# Patient Record
Sex: MALE | Race: OTHER | NOT HISPANIC OR LATINO | URBAN - METROPOLITAN AREA
[De-identification: names, ages, dates, MRNs, and addresses within clinical notes are randomized per-mention and may not be internally consistent; named-entity substitution may affect disease eponyms.]

---

## 2017-04-06 ENCOUNTER — OUTPATIENT (OUTPATIENT)
Dept: OUTPATIENT SERVICES | Facility: HOSPITAL | Age: 37
LOS: 1 days | End: 2017-04-06

## 2017-04-06 VITALS
TEMPERATURE: 98 F | WEIGHT: 304.9 LBS | HEART RATE: 84 BPM | HEIGHT: 73.5 IN | DIASTOLIC BLOOD PRESSURE: 86 MMHG | SYSTOLIC BLOOD PRESSURE: 132 MMHG | RESPIRATION RATE: 16 BRPM

## 2017-04-06 DIAGNOSIS — G47.33 OBSTRUCTIVE SLEEP APNEA (ADULT) (PEDIATRIC): ICD-10-CM

## 2017-04-06 DIAGNOSIS — L72.9 FOLLICULAR CYST OF THE SKIN AND SUBCUTANEOUS TISSUE, UNSPECIFIED: Chronic | ICD-10-CM

## 2017-04-06 DIAGNOSIS — S86.012A STRAIN OF LEFT ACHILLES TENDON, INITIAL ENCOUNTER: Chronic | ICD-10-CM

## 2017-04-06 DIAGNOSIS — D16.4 BENIGN NEOPLASM OF BONES OF SKULL AND FACE: ICD-10-CM

## 2017-04-06 LAB
BLD GP AB SCN SERPL QL: NEGATIVE — SIGNIFICANT CHANGE UP
BUN SERPL-MCNC: 17 MG/DL — SIGNIFICANT CHANGE UP (ref 7–23)
CALCIUM SERPL-MCNC: 9.6 MG/DL — SIGNIFICANT CHANGE UP (ref 8.4–10.5)
CHLORIDE SERPL-SCNC: 104 MMOL/L — SIGNIFICANT CHANGE UP (ref 98–107)
CO2 SERPL-SCNC: 23 MMOL/L — SIGNIFICANT CHANGE UP (ref 22–31)
CREAT SERPL-MCNC: 1.4 MG/DL — HIGH (ref 0.5–1.3)
GLUCOSE SERPL-MCNC: 93 MG/DL — SIGNIFICANT CHANGE UP (ref 70–99)
HCT VFR BLD CALC: 43.9 % — SIGNIFICANT CHANGE UP (ref 39–50)
HGB BLD-MCNC: 15 G/DL — SIGNIFICANT CHANGE UP (ref 13–17)
MCHC RBC-ENTMCNC: 30.5 PG — SIGNIFICANT CHANGE UP (ref 27–34)
MCHC RBC-ENTMCNC: 34.2 % — SIGNIFICANT CHANGE UP (ref 32–36)
MCV RBC AUTO: 89.2 FL — SIGNIFICANT CHANGE UP (ref 80–100)
PLATELET # BLD AUTO: 215 K/UL — SIGNIFICANT CHANGE UP (ref 150–400)
PMV BLD: 10.6 FL — SIGNIFICANT CHANGE UP (ref 7–13)
POTASSIUM SERPL-MCNC: 3.9 MMOL/L — SIGNIFICANT CHANGE UP (ref 3.5–5.3)
POTASSIUM SERPL-SCNC: 3.9 MMOL/L — SIGNIFICANT CHANGE UP (ref 3.5–5.3)
RBC # BLD: 4.92 M/UL — SIGNIFICANT CHANGE UP (ref 4.2–5.8)
RBC # FLD: 14.1 % — SIGNIFICANT CHANGE UP (ref 10.3–14.5)
RH IG SCN BLD-IMP: POSITIVE — SIGNIFICANT CHANGE UP
SODIUM SERPL-SCNC: 144 MMOL/L — SIGNIFICANT CHANGE UP (ref 135–145)
WBC # BLD: 5.2 K/UL — SIGNIFICANT CHANGE UP (ref 3.8–10.5)
WBC # FLD AUTO: 5.2 K/UL — SIGNIFICANT CHANGE UP (ref 3.8–10.5)

## 2017-04-06 RX ORDER — SODIUM CHLORIDE 9 MG/ML
1000 INJECTION, SOLUTION INTRAVENOUS
Qty: 0 | Refills: 0 | Status: DISCONTINUED | OUTPATIENT
Start: 2017-04-18 | End: 2017-05-03

## 2017-04-06 NOTE — H&P PST ADULT - MUSCULOSKELETAL
details… detailed exam no calf tenderness/no joint swelling/ROM intact/no joint erythema/no joint warmth

## 2017-04-06 NOTE — H&P PST ADULT - FAMILY HISTORY
Father  Still living? Yes, Estimated age: Age Unknown  Family history of prostate cancer, Age at diagnosis: Age Unknown

## 2017-04-06 NOTE — H&P PST ADULT - NEGATIVE MUSCULOSKELETAL SYMPTOMS
no muscle weakness/no muscle cramps/no joint swelling/no stiffness/no myalgia/no arthritis/no neck pain

## 2017-04-06 NOTE — H&P PST ADULT - NEGATIVE ALLERGY TYPES
no reactions to medicines/no outdoor environmental allergies/no indoor environmental allergies/no reactions to animals/no reactions to food

## 2017-04-06 NOTE — H&P PST ADULT - PROBLEM SELECTOR PLAN 1
Scheduled for enucleation cyst left maxilla buccal fat flap, possible removal teeth # 12, 13 on 4/18/2017. labs done and results pending. Preop instruction given and explained. Famotidine provided with instruction. Verbalized understanding.

## 2017-04-06 NOTE — H&P PST ADULT - RS GEN PE MLT RESP DETAILS PC
breath sounds equal/airway patent/no rhonchi/no wheezes/good air movement/clear to auscultation bilaterally/no rales/respirations non-labored

## 2017-04-06 NOTE — H&P PST ADULT - NEGATIVE ENMT SYMPTOMS
no nasal discharge/no ear pain/no tinnitus/no hearing difficulty/no nasal obstruction/no vertigo no ear pain/no post-nasal discharge/no nose bleeds/no nasal discharge/no nasal obstruction/no dysphagia/no hearing difficulty/no gum bleeding/no vertigo/no tinnitus

## 2017-04-06 NOTE — H&P PST ADULT - HISTORY OF PRESENT ILLNESS
37 yo male 37 yo male with no significant pmhx present to have PST evaluation for enucleation cyst left maxilla buccal fat flap, possible removal teeth # 12,13 on 4/18/2017. Patient c/o headache, left jaw pain, swelling of gum, went to dentist for an evaluation, was sent to Dr. Garcia (oral surgeon), had CT scan done which revealed  "cyst on jaw",  cyst was drained & biopsy was done. Patient was sent for further evaluation, seen by Dr. Uriarte, had CT scan, surgical intervention was recommended.

## 2017-04-06 NOTE — H&P PST ADULT - NSANTHOSAYNRD_GEN_A_CORE
No. INGE screening performed.  STOP BANG Legend: 0-2 = LOW Risk; 3-4 = INTERMEDIATE Risk; 5-8 = HIGH Risk

## 2017-04-06 NOTE — H&P PST ADULT - NEGATIVE NEUROLOGICAL SYMPTOMS
no weakness/no transient paralysis/no tremors/no syncope/no focal seizures/no paresthesias/no headache/no vertigo

## 2017-04-06 NOTE — H&P PST ADULT - NEGATIVE GENERAL GENITOURINARY SYMPTOMS
no hematuria/no renal colic/no flank pain L/normal urinary frequency/no incontinence no renal colic/no hematuria/no incontinence/no flank pain L/normal urinary frequency/no bladder infections

## 2017-04-06 NOTE — H&P PST ADULT - ENMT COMMENTS
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

## 2017-04-17 ENCOUNTER — RESULT REVIEW (OUTPATIENT)
Age: 37
End: 2017-04-17

## 2017-04-18 ENCOUNTER — OUTPATIENT (OUTPATIENT)
Dept: OUTPATIENT SERVICES | Facility: HOSPITAL | Age: 37
LOS: 1 days | Discharge: HOME | End: 2017-04-18
Payer: COMMERCIAL

## 2017-04-18 VITALS
RESPIRATION RATE: 14 BRPM | TEMPERATURE: 98 F | DIASTOLIC BLOOD PRESSURE: 78 MMHG | OXYGEN SATURATION: 100 % | HEART RATE: 72 BPM | SYSTOLIC BLOOD PRESSURE: 124 MMHG

## 2017-04-18 VITALS
OXYGEN SATURATION: 94 % | HEIGHT: 73.5 IN | TEMPERATURE: 98 F | SYSTOLIC BLOOD PRESSURE: 145 MMHG | RESPIRATION RATE: 18 BRPM | WEIGHT: 304.9 LBS | HEART RATE: 71 BPM | DIASTOLIC BLOOD PRESSURE: 85 MMHG

## 2017-04-18 DIAGNOSIS — D16.4 BENIGN NEOPLASM OF BONES OF SKULL AND FACE: ICD-10-CM

## 2017-04-18 DIAGNOSIS — S86.012A STRAIN OF LEFT ACHILLES TENDON, INITIAL ENCOUNTER: Chronic | ICD-10-CM

## 2017-04-18 DIAGNOSIS — L72.9 FOLLICULAR CYST OF THE SKIN AND SUBCUTANEOUS TISSUE, UNSPECIFIED: Chronic | ICD-10-CM

## 2017-04-18 PROCEDURE — 88305 TISSUE EXAM BY PATHOLOGIST: CPT | Mod: 26

## 2017-04-18 RX ORDER — ONDANSETRON 8 MG/1
4 TABLET, FILM COATED ORAL ONCE
Qty: 0 | Refills: 0 | Status: COMPLETED | OUTPATIENT
Start: 2017-04-18 | End: 2017-04-18

## 2017-04-18 RX ORDER — FENTANYL CITRATE 50 UG/ML
50 INJECTION INTRAVENOUS
Qty: 0 | Refills: 0 | Status: DISCONTINUED | OUTPATIENT
Start: 2017-04-18 | End: 2017-04-18

## 2017-04-18 RX ORDER — CHLORHEXIDINE GLUCONATE 213 G/1000ML
15 SOLUTION TOPICAL
Qty: 1 | Refills: 0 | OUTPATIENT
Start: 2017-04-18 | End: 2017-05-02

## 2017-04-18 RX ORDER — KETOROLAC TROMETHAMINE 30 MG/ML
1 SYRINGE (ML) INJECTION
Qty: 20 | Refills: 0 | OUTPATIENT
Start: 2017-04-18 | End: 2017-04-23

## 2017-04-18 RX ADMIN — ONDANSETRON 4 MILLIGRAM(S): 8 TABLET, FILM COATED ORAL at 13:30

## 2017-04-18 NOTE — H&P ADULT. - NEGATIVE ENMT SYMPTOMS
no ear pain/no nasal discharge/no post-nasal discharge/no gum bleeding/no nose bleeds/no hearing difficulty/no nasal obstruction/no dysphagia

## 2017-04-18 NOTE — H&P ADULT. - HISTORY OF PRESENT ILLNESS
37 yo male, with no significant PMH s/p left sinus biopsy, with inflammatory cyst of left maxillary sinus.  Presents for enucleation cyst left maxilla buccal fat flap, possible removal teeth # 12,13.

## 2017-04-18 NOTE — ASU DISCHARGE PLAN (ADULT/PEDIATRIC). - SPECIAL INSTRUCTIONS
Follow up with Dr. Uriarte 4/25/17 at 1145 am. Rx sent to pharmacy by MD Follow up with Dr. Uriarte 4/25/17 at 1145 am. Rx sent to pharmacy by MD. Do not drink through a straw.

## 2017-04-18 NOTE — H&P ADULT. - RS GEN PE MLT RESP DETAILS PC
good air movement/airway patent/breath sounds equal/clear to auscultation bilaterally/no wheezes/no rales/no rhonchi/respirations non-labored

## 2017-04-18 NOTE — H&P ADULT. - ENMT COMMENTS
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

## 2017-04-18 NOTE — ASU DISCHARGE PLAN (ADULT/PEDIATRIC). - NOTIFY
Fever greater than 101/Bleeding that does not stop Pain not relieved by Medications/Unable to Urinate/Inability to Tolerate Liquids or Foods/Bleeding that does not stop/Persistent Nausea and Vomiting/Fever greater than 101/Swelling that continues

## 2017-04-18 NOTE — ASU DISCHARGE PLAN (ADULT/PEDIATRIC). - MEDICATION SUMMARY - MEDICATIONS TO TAKE
I will START or STAY ON the medications listed below when I get home from the hospital:    ketorolac 10 mg oral tablet  -- 1 tab(s) by mouth every 6 hours  -- It is very important that you take or use this exactly as directed.  Do not skip doses or discontinue unless directed by your doctor.  May cause drowsiness or dizziness.  Obtain medical advice before taking any non-prescription drugs as some may affect the action of this medication.  Take with food or milk.    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  May cause drowsiness.  Alcohol may intensify this effect.  Use care when operating dangerous machinery.  This product contains acetaminophen.  Do not use  with any other product containing acetaminophen to prevent possible liver damage.  Using more of this medication than prescribed may cause serious breathing problems.    -- Indication: For Benign neoplasm of bones of skull and face    ibuprofen 800 mg oral tablet  -- 1 tab(s) by mouth once a day, As Needed. Last dose 4/10/17  -- Indication: For Benign neoplasm of bones of skull and face    Peridex 0.12% mucous membrane liquid  -- 15 milliliter(s) mucous membrane 2 times a day  -- Indication: For Benign neoplasm of bones of skull and face    amoxicillin-clavulanate 875 mg-125 mg oral tablet  -- 1 milligram(s) by mouth every 12 hours  -- Finish all this medication unless otherwise directed by prescriber.  Take with food or milk.    -- Indication: For Benign neoplasm of bones of skull and face

## 2017-04-18 NOTE — H&P ADULT. - PROBLEM SELECTOR PLAN 1
enucleation cyst left maxilla, oac closure with buccal fat pad advancement, possible removal teeth # 12,13.

## 2017-04-19 ENCOUNTER — TRANSCRIPTION ENCOUNTER (OUTPATIENT)
Age: 37
End: 2017-04-19

## 2017-09-26 ENCOUNTER — APPOINTMENT (OUTPATIENT)
Dept: OTOLARYNGOLOGY | Facility: CLINIC | Age: 37
End: 2017-09-26
Payer: COMMERCIAL

## 2017-09-26 VITALS
DIASTOLIC BLOOD PRESSURE: 92 MMHG | WEIGHT: 290 LBS | HEART RATE: 62 BPM | BODY MASS INDEX: 37.22 KG/M2 | SYSTOLIC BLOOD PRESSURE: 138 MMHG | HEIGHT: 74 IN

## 2017-09-26 DIAGNOSIS — Z78.9 OTHER SPECIFIED HEALTH STATUS: ICD-10-CM

## 2017-09-26 DIAGNOSIS — F17.290 NICOTINE DEPENDENCE, OTHER TOBACCO PRODUCT, UNCOMPLICATED: ICD-10-CM

## 2017-09-26 PROCEDURE — 31231 NASAL ENDOSCOPY DX: CPT

## 2017-09-26 PROCEDURE — 99205 OFFICE O/P NEW HI 60 MIN: CPT | Mod: 25

## 2017-10-03 ENCOUNTER — OUTPATIENT (OUTPATIENT)
Dept: OUTPATIENT SERVICES | Facility: HOSPITAL | Age: 37
LOS: 1 days | End: 2017-10-03

## 2017-10-03 VITALS
HEART RATE: 72 BPM | WEIGHT: 276.02 LBS | TEMPERATURE: 98 F | DIASTOLIC BLOOD PRESSURE: 70 MMHG | HEIGHT: 73.25 IN | SYSTOLIC BLOOD PRESSURE: 124 MMHG | RESPIRATION RATE: 16 BRPM

## 2017-10-03 DIAGNOSIS — S86.012A STRAIN OF LEFT ACHILLES TENDON, INITIAL ENCOUNTER: Chronic | ICD-10-CM

## 2017-10-03 DIAGNOSIS — L72.9 FOLLICULAR CYST OF THE SKIN AND SUBCUTANEOUS TISSUE, UNSPECIFIED: Chronic | ICD-10-CM

## 2017-10-03 DIAGNOSIS — M27.40 UNSPECIFIED CYST OF JAW: Chronic | ICD-10-CM

## 2017-10-03 DIAGNOSIS — D16.5 BENIGN NEOPLASM OF LOWER JAW BONE: ICD-10-CM

## 2017-10-03 DIAGNOSIS — G47.33 OBSTRUCTIVE SLEEP APNEA (ADULT) (PEDIATRIC): ICD-10-CM

## 2017-10-03 LAB
BLD GP AB SCN SERPL QL: NEGATIVE — SIGNIFICANT CHANGE UP
HCT VFR BLD CALC: 47.2 % — SIGNIFICANT CHANGE UP (ref 39–50)
HGB BLD-MCNC: 15.7 G/DL — SIGNIFICANT CHANGE UP (ref 13–17)
MCHC RBC-ENTMCNC: 29.4 PG — SIGNIFICANT CHANGE UP (ref 27–34)
MCHC RBC-ENTMCNC: 33.3 % — SIGNIFICANT CHANGE UP (ref 32–36)
MCV RBC AUTO: 88.4 FL — SIGNIFICANT CHANGE UP (ref 80–100)
NRBC # FLD: 0 — SIGNIFICANT CHANGE UP
PLATELET # BLD AUTO: 223 K/UL — SIGNIFICANT CHANGE UP (ref 150–400)
PMV BLD: 10.4 FL — SIGNIFICANT CHANGE UP (ref 7–13)
RBC # BLD: 5.34 M/UL — SIGNIFICANT CHANGE UP (ref 4.2–5.8)
RBC # FLD: 13.7 % — SIGNIFICANT CHANGE UP (ref 10.3–14.5)
RH IG SCN BLD-IMP: POSITIVE — SIGNIFICANT CHANGE UP
WBC # BLD: 5.47 K/UL — SIGNIFICANT CHANGE UP (ref 3.8–10.5)
WBC # FLD AUTO: 5.47 K/UL — SIGNIFICANT CHANGE UP (ref 3.8–10.5)

## 2017-10-03 RX ORDER — IBUPROFEN 200 MG
1 TABLET ORAL
Qty: 0 | Refills: 0 | COMMUNITY

## 2017-10-03 NOTE — H&P PST ADULT - NEGATIVE ENMT SYMPTOMS
no vertigo/no dysphagia/no nose bleeds/no ear pain/no nasal discharge/no post-nasal discharge/no tinnitus/no sinus symptoms/no gum bleeding/no nasal congestion/no nasal obstruction/no hearing difficulty

## 2017-10-03 NOTE — H&P PST ADULT - HISTORY OF PRESENT ILLNESS
37 yo male present to have PST evaluation for left maxillectomy, left infrastructure maxillary & placement, obturator with wires/screws on 10/18/2017. 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 Patient c/o soreness on left side of face. 37 yo male present to have PST evaluation for left maxillectomy, left infrastructure maxillary & placement, obturator with wires/screws on 10/18/2017. Patient report, hx of "cyst on jaw" s/p enucleation cyst left maxilla buccal fat flap on 4/18/2017. Patient states, f/u CT scan was done in 8/2017, which revealed, "recurrent tumor in jaw", surgical intervention was recommended. 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 Patient report, hx of "cyst on jaw", s/p enucleation cyst left maxilla buccal fat flap on 4/18/2017. Patient states, f/u CT scan was done in 8/2017, which revealed, "recurrent tumor in jaw", surgical intervention was recommended.

## 2017-10-03 NOTE — H&P PST ADULT - CONSTITUTIONAL DETAILS
well-groomed/well-developed/no distress/well-nourished obese/no distress/well-developed/well-nourished/well-groomed

## 2017-10-03 NOTE — H&P PST ADULT - NEGATIVE ALLERGY TYPES
no reactions to medicines/no reactions to food/no outdoor environmental allergies/no indoor environmental allergies/no reactions to animals

## 2017-10-03 NOTE — H&P PST ADULT - ENMT COMMENTS
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

## 2017-10-03 NOTE — H&P PST ADULT - PROBLEM SELECTOR PLAN 1
Scheduled for left maxillectomy, left infrastructure maxillary & placement, obturator with wires/screws on 10/18/2017. labs done and results pending.  Preop instruction given and explained. Famotidine provided with instruction. Verbalized understanding.

## 2017-10-03 NOTE — H&P PST ADULT - NEGATIVE NEUROLOGICAL SYMPTOMS
no paresthesias/no transient paralysis/no headache/no weakness/no focal seizures/no syncope/no vertigo/no tremors

## 2017-10-03 NOTE — H&P PST ADULT - PSH
Achilles rupture, left  2010 repair  Cyst of skin  back 4/5/2017  Jaw cyst  s/p enucleation cyst left maxilla buccal fat flap 4/2017

## 2017-10-03 NOTE — H&P PST ADULT - NEGATIVE MUSCULOSKELETAL SYMPTOMS
no neck pain/no muscle weakness/no arthritis/no joint swelling/no muscle cramps/no stiffness/no myalgia

## 2017-10-03 NOTE — H&P PST ADULT - ASSESSMENT
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

## 2017-10-03 NOTE — H&P PST ADULT - MUSCULOSKELETAL
details… detailed exam no joint warmth/no joint erythema/ROM intact/no joint swelling/no calf tenderness

## 2017-10-03 NOTE — H&P PST ADULT - NEGATIVE GENERAL GENITOURINARY SYMPTOMS
no urinary hesitancy/no flank pain L/no incontinence/no dysuria/no bladder infections/no flank pain R/no hematuria/normal urinary frequency

## 2017-10-03 NOTE — H&P PST ADULT - RS GEN PE MLT RESP DETAILS PC
no wheezes/good air movement/clear to auscultation bilaterally/no rhonchi/airway patent/respirations non-labored/breath sounds equal/no rales

## 2017-10-18 ENCOUNTER — INPATIENT (INPATIENT)
Facility: HOSPITAL | Age: 37
LOS: 0 days | Discharge: HOME | End: 2017-10-19
Attending: OTOLARYNGOLOGY | Admitting: OTOLARYNGOLOGY
Payer: COMMERCIAL

## 2017-10-18 ENCOUNTER — TRANSCRIPTION ENCOUNTER (OUTPATIENT)
Age: 37
End: 2017-10-18

## 2017-10-18 ENCOUNTER — RESULT REVIEW (OUTPATIENT)
Age: 37
End: 2017-10-18

## 2017-10-18 ENCOUNTER — APPOINTMENT (OUTPATIENT)
Dept: OTOLARYNGOLOGY | Facility: HOSPITAL | Age: 37
End: 2017-10-18

## 2017-10-18 VITALS
OXYGEN SATURATION: 96 % | SYSTOLIC BLOOD PRESSURE: 141 MMHG | TEMPERATURE: 98 F | HEIGHT: 73.25 IN | DIASTOLIC BLOOD PRESSURE: 87 MMHG | HEART RATE: 81 BPM | WEIGHT: 276.02 LBS | RESPIRATION RATE: 14 BRPM

## 2017-10-18 DIAGNOSIS — D16.4 BENIGN NEOPLASM OF BONES OF SKULL AND FACE: ICD-10-CM

## 2017-10-18 DIAGNOSIS — L72.9 FOLLICULAR CYST OF THE SKIN AND SUBCUTANEOUS TISSUE, UNSPECIFIED: Chronic | ICD-10-CM

## 2017-10-18 DIAGNOSIS — M27.40 UNSPECIFIED CYST OF JAW: Chronic | ICD-10-CM

## 2017-10-18 DIAGNOSIS — S86.012A STRAIN OF LEFT ACHILLES TENDON, INITIAL ENCOUNTER: Chronic | ICD-10-CM

## 2017-10-18 PROCEDURE — 88305 TISSUE EXAM BY PATHOLOGIST: CPT | Mod: 26

## 2017-10-18 PROCEDURE — 88311 DECALCIFY TISSUE: CPT | Mod: 26

## 2017-10-18 PROCEDURE — 88307 TISSUE EXAM BY PATHOLOGIST: CPT | Mod: 26

## 2017-10-18 PROCEDURE — 88300 SURGICAL PATH GROSS: CPT | Mod: 26,59

## 2017-10-18 RX ORDER — HEPARIN SODIUM 5000 [USP'U]/ML
5000 INJECTION INTRAVENOUS; SUBCUTANEOUS EVERY 12 HOURS
Qty: 0 | Refills: 0 | Status: DISCONTINUED | OUTPATIENT
Start: 2017-10-18 | End: 2017-10-19

## 2017-10-18 RX ORDER — INFLUENZA VIRUS VACCINE 15; 15; 15; 15 UG/.5ML; UG/.5ML; UG/.5ML; UG/.5ML
0.5 SUSPENSION INTRAMUSCULAR ONCE
Qty: 0 | Refills: 0 | Status: COMPLETED | OUTPATIENT
Start: 2017-10-18 | End: 2017-10-19

## 2017-10-18 RX ORDER — AMPICILLIN SODIUM AND SULBACTAM SODIUM 250; 125 MG/ML; MG/ML
INJECTION, POWDER, FOR SUSPENSION INTRAMUSCULAR; INTRAVENOUS
Qty: 0 | Refills: 0 | Status: DISCONTINUED | OUTPATIENT
Start: 2017-10-18 | End: 2017-10-19

## 2017-10-18 RX ORDER — CHLORHEXIDINE GLUCONATE 213 G/1000ML
15 SOLUTION TOPICAL
Qty: 0 | Refills: 0 | Status: DISCONTINUED | OUTPATIENT
Start: 2017-10-18 | End: 2017-10-19

## 2017-10-18 RX ORDER — ONDANSETRON 8 MG/1
4 TABLET, FILM COATED ORAL ONCE
Qty: 0 | Refills: 0 | Status: DISCONTINUED | OUTPATIENT
Start: 2017-10-18 | End: 2017-10-18

## 2017-10-18 RX ORDER — SODIUM CHLORIDE 9 MG/ML
1000 INJECTION, SOLUTION INTRAVENOUS
Qty: 0 | Refills: 0 | Status: DISCONTINUED | OUTPATIENT
Start: 2017-10-18 | End: 2017-10-18

## 2017-10-18 RX ORDER — HYDROMORPHONE HYDROCHLORIDE 2 MG/ML
1 INJECTION INTRAMUSCULAR; INTRAVENOUS; SUBCUTANEOUS ONCE
Qty: 0 | Refills: 0 | Status: DISCONTINUED | OUTPATIENT
Start: 2017-10-18 | End: 2017-10-18

## 2017-10-18 RX ORDER — AMPICILLIN SODIUM AND SULBACTAM SODIUM 250; 125 MG/ML; MG/ML
1.5 INJECTION, POWDER, FOR SUSPENSION INTRAMUSCULAR; INTRAVENOUS EVERY 6 HOURS
Qty: 0 | Refills: 0 | Status: DISCONTINUED | OUTPATIENT
Start: 2017-10-18 | End: 2017-10-19

## 2017-10-18 RX ORDER — SODIUM CHLORIDE 9 MG/ML
1000 INJECTION, SOLUTION INTRAVENOUS
Qty: 0 | Refills: 0 | Status: DISCONTINUED | OUTPATIENT
Start: 2017-10-18 | End: 2017-10-19

## 2017-10-18 RX ORDER — MORPHINE SULFATE 50 MG/1
4 CAPSULE, EXTENDED RELEASE ORAL
Qty: 0 | Refills: 0 | Status: DISCONTINUED | OUTPATIENT
Start: 2017-10-18 | End: 2017-10-19

## 2017-10-18 RX ORDER — OXYCODONE AND ACETAMINOPHEN 5; 325 MG/1; MG/1
1 TABLET ORAL EVERY 6 HOURS
Qty: 0 | Refills: 0 | Status: DISCONTINUED | OUTPATIENT
Start: 2017-10-18 | End: 2017-10-19

## 2017-10-18 RX ORDER — HYDROMORPHONE HYDROCHLORIDE 2 MG/ML
0.5 INJECTION INTRAMUSCULAR; INTRAVENOUS; SUBCUTANEOUS
Qty: 0 | Refills: 0 | Status: DISCONTINUED | OUTPATIENT
Start: 2017-10-18 | End: 2017-10-18

## 2017-10-18 RX ORDER — AMPICILLIN SODIUM AND SULBACTAM SODIUM 250; 125 MG/ML; MG/ML
3 INJECTION, POWDER, FOR SUSPENSION INTRAMUSCULAR; INTRAVENOUS ONCE
Qty: 0 | Refills: 0 | Status: COMPLETED | OUTPATIENT
Start: 2017-10-18 | End: 2017-10-18

## 2017-10-18 RX ADMIN — HYDROMORPHONE HYDROCHLORIDE 0.5 MILLIGRAM(S): 2 INJECTION INTRAMUSCULAR; INTRAVENOUS; SUBCUTANEOUS at 13:18

## 2017-10-18 RX ADMIN — HEPARIN SODIUM 5000 UNIT(S): 5000 INJECTION INTRAVENOUS; SUBCUTANEOUS at 17:42

## 2017-10-18 RX ADMIN — MORPHINE SULFATE 4 MILLIGRAM(S): 50 CAPSULE, EXTENDED RELEASE ORAL at 22:52

## 2017-10-18 RX ADMIN — AMPICILLIN SODIUM AND SULBACTAM SODIUM 200 GRAM(S): 250; 125 INJECTION, POWDER, FOR SUSPENSION INTRAMUSCULAR; INTRAVENOUS at 17:42

## 2017-10-18 RX ADMIN — HYDROMORPHONE HYDROCHLORIDE 1 MILLIGRAM(S): 2 INJECTION INTRAMUSCULAR; INTRAVENOUS; SUBCUTANEOUS at 12:04

## 2017-10-18 RX ADMIN — AMPICILLIN SODIUM AND SULBACTAM SODIUM 200 GRAM(S): 250; 125 INJECTION, POWDER, FOR SUSPENSION INTRAMUSCULAR; INTRAVENOUS at 23:01

## 2017-10-18 RX ADMIN — HYDROMORPHONE HYDROCHLORIDE 1 MILLIGRAM(S): 2 INJECTION INTRAMUSCULAR; INTRAVENOUS; SUBCUTANEOUS at 11:51

## 2017-10-18 RX ADMIN — MORPHINE SULFATE 4 MILLIGRAM(S): 50 CAPSULE, EXTENDED RELEASE ORAL at 18:40

## 2017-10-18 RX ADMIN — MORPHINE SULFATE 4 MILLIGRAM(S): 50 CAPSULE, EXTENDED RELEASE ORAL at 17:42

## 2017-10-18 RX ADMIN — HYDROMORPHONE HYDROCHLORIDE 0.5 MILLIGRAM(S): 2 INJECTION INTRAMUSCULAR; INTRAVENOUS; SUBCUTANEOUS at 13:03

## 2017-10-18 RX ADMIN — AMPICILLIN SODIUM AND SULBACTAM SODIUM 200 GRAM(S): 250; 125 INJECTION, POWDER, FOR SUSPENSION INTRAMUSCULAR; INTRAVENOUS at 11:54

## 2017-10-18 RX ADMIN — MORPHINE SULFATE 4 MILLIGRAM(S): 50 CAPSULE, EXTENDED RELEASE ORAL at 23:25

## 2017-10-18 RX ADMIN — CHLORHEXIDINE GLUCONATE 15 MILLILITER(S): 213 SOLUTION TOPICAL at 17:42

## 2017-10-18 NOTE — BRIEF OPERATIVE NOTE - PROCEDURE
<<-----Click on this checkbox to enter Procedure Maxillectomy without orbital exenteration  10/18/2017  Left maxillectomy with obturator placement  Active  [id number]

## 2017-10-18 NOTE — PROGRESS NOTE ADULT - SUBJECTIVE AND OBJECTIVE BOX
36y Male 5 hours s/p left maxillectomy and placement of obturator.  Patient examined at bedside on floor.  NEO since OR. Patient states pain is well controlled.  Denies any fever, chills, nausea, vomiting.  Patient voiding, not yet ambulating.    Vital Signs Last 24 Hrs  T(C): 36.8 (18 Oct 2017 17:29), Max: 36.8 (18 Oct 2017 17:29)  T(F): 98.2 (18 Oct 2017 17:29), Max: 98.2 (18 Oct 2017 17:29)  HR: 86 (18 Oct 2017 17:29) (65 - 86)  BP: 130/90 (18 Oct 2017 17:29) (121/98 - 155/69)  BP(mean): --  RR: 18 (18 Oct 2017 17:29) (12 - 25)  SpO2: 96% (18 Oct 2017 17:29) (95% - 100%)    PE:   Gen: AAOx3, NAD  EOE: mild midface edema  IOE: sutures intact, wounds hemostatic, obturator in place.  CV: RRR, normal s1/s2  PE: CTAB  Abdominal: soft, non-tender, non-distended  Extremities: no edema      I&O's Summary    18 Oct 2017 07:01  -  18 Oct 2017 17:43  --------------------------------------------------------  IN: 300 mL / OUT: 750 mL / NET: -450 mL        A/P:  36y Male 5 hours s/p left maxillectomy and placement of obturator. Patient recovering well.  -Care as per primary team  -OMFS to continue to follow OMFS post-op note    36y Male 5 hours s/p left maxillectomy and placement of obturator.  Patient examined at bedside on floor.  NEO since OR. Patient states pain is well controlled.  Denies any fever, chills, nausea, vomiting.  Patient voiding, not yet ambulating.    Vital Signs Last 24 Hrs  T(C): 36.8 (18 Oct 2017 17:29), Max: 36.8 (18 Oct 2017 17:29)  T(F): 98.2 (18 Oct 2017 17:29), Max: 98.2 (18 Oct 2017 17:29)  HR: 86 (18 Oct 2017 17:29) (65 - 86)  BP: 130/90 (18 Oct 2017 17:29) (121/98 - 155/69)  BP(mean): --  RR: 18 (18 Oct 2017 17:29) (12 - 25)  SpO2: 96% (18 Oct 2017 17:29) (95% - 100%)    PE:   Gen: AAOx3, NAD  EOE: mild midface edema  IOE: sutures intact, wounds hemostatic, obturator in place.  CV: RRR, normal s1/s2  PE: CTAB  Abdominal: soft, non-tender, non-distended  Extremities: no edema      I&O's Summary    18 Oct 2017 07:01  -  18 Oct 2017 17:43  --------------------------------------------------------  IN: 300 mL / OUT: 750 mL / NET: -450 mL        A/P:  36y Male 5 hours s/p left maxillectomy and placement of obturator. Patient recovering well.  -Care as per primary team  -OMFS to continue to follow

## 2017-10-19 ENCOUNTER — TRANSCRIPTION ENCOUNTER (OUTPATIENT)
Age: 37
End: 2017-10-19

## 2017-10-19 VITALS
RESPIRATION RATE: 18 BRPM | HEART RATE: 85 BPM | SYSTOLIC BLOOD PRESSURE: 126 MMHG | TEMPERATURE: 99 F | OXYGEN SATURATION: 96 % | DIASTOLIC BLOOD PRESSURE: 84 MMHG

## 2017-10-19 PROCEDURE — 99239 HOSP IP/OBS DSCHRG MGMT >30: CPT

## 2017-10-19 RX ORDER — CHLORHEXIDINE GLUCONATE 213 G/1000ML
15 SOLUTION TOPICAL
Qty: 650 | Refills: 0
Start: 2017-10-19 | End: 2017-11-02

## 2017-10-19 RX ADMIN — AMPICILLIN SODIUM AND SULBACTAM SODIUM 200 GRAM(S): 250; 125 INJECTION, POWDER, FOR SUSPENSION INTRAMUSCULAR; INTRAVENOUS at 11:19

## 2017-10-19 RX ADMIN — OXYCODONE AND ACETAMINOPHEN 1 TABLET(S): 5; 325 TABLET ORAL at 03:43

## 2017-10-19 RX ADMIN — OXYCODONE AND ACETAMINOPHEN 1 TABLET(S): 5; 325 TABLET ORAL at 04:20

## 2017-10-19 RX ADMIN — OXYCODONE AND ACETAMINOPHEN 1 TABLET(S): 5; 325 TABLET ORAL at 10:42

## 2017-10-19 RX ADMIN — INFLUENZA VIRUS VACCINE 0.5 MILLILITER(S): 15; 15; 15; 15 SUSPENSION INTRAMUSCULAR at 13:33

## 2017-10-19 RX ADMIN — AMPICILLIN SODIUM AND SULBACTAM SODIUM 200 GRAM(S): 250; 125 INJECTION, POWDER, FOR SUSPENSION INTRAMUSCULAR; INTRAVENOUS at 05:44

## 2017-10-19 RX ADMIN — HEPARIN SODIUM 5000 UNIT(S): 5000 INJECTION INTRAVENOUS; SUBCUTANEOUS at 05:43

## 2017-10-19 RX ADMIN — CHLORHEXIDINE GLUCONATE 15 MILLILITER(S): 213 SOLUTION TOPICAL at 05:44

## 2017-10-19 RX ADMIN — OXYCODONE AND ACETAMINOPHEN 1 TABLET(S): 5; 325 TABLET ORAL at 11:40

## 2017-10-19 NOTE — DISCHARGE NOTE ADULT - HOSPITAL COURSE
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 Discharged home on 10/19/19.

## 2017-10-19 NOTE — DISCHARGE NOTE ADULT - CONDITIONS AT DISCHARGE
Pt A&Ox4. Vs stable. Pain is well controlled. Oral cavity incision intact with obturator in place. Pt OOB with assist and AD, tolerating clear liquid diet, and voiding adequately.

## 2017-10-19 NOTE — DISCHARGE NOTE ADULT - CARE PROVIDER_API CALL
Campbell Castrejon), Otolaryngology  [address] [place] [address]  Phone: [phone]  Fax: [phone]

## 2017-10-19 NOTE — DISCHARGE NOTE ADULT - INSTRUCTIONS
Please NOTIFY MD for any of the following s/s: S/S infection (Fever >100.4, chills, increased redness, increased bleeding, pus-like drainage from incision line), uncontrolled pain not relieved by pain medications, persistent nausea/vomiting or inability to tolerate diet. No heavy lifting; No driving if/while taking narcotic pain medications. Please drink 6-8 glasses of water daily to stay hydrated. Please NOTIFY MD for any of the following s/s: S/S infection (Fever >100.4, chills, increased redness, increased bleeding, pus-like drainage from incision line), uncontrolled pain not relieved by pain medications, persistent nausea/vomiting or inability to tolerate liquid diet. No heavy lifting; No driving if/while taking narcotic pain medications. Please drink 6-8 glasses of water daily to stay hydrated.

## 2017-10-19 NOTE — PROGRESS NOTE ADULT - ASSESSMENT
A/P 36M s/p left maxillectomy with obturator placement 10/18. Recovering well  -	Continue IV unasyn as inpatient. Patient to be discharged on augmentin  -	Okay to start clears  -	Peridex BID  -	Pain control PRN  -	Home meds  -	OOB/SCDs  -	IVF until PO intake adequate  -	Leave obturator in place  -	Monitor VS  -	Admit for observation overnight
A/P 36M s/p left maxillectomy with obturator placement 10/18. Recovering well  -	Continue IV unasyn as inpatient. Patient to be discharged on augmentin  -	CLD  -	Peridex BID  -	Pain control PRN  -	Home meds  -	OOB/SCDs/SQH  -	IVF until PO intake adequate  -	Leave obturator in place  -	Monitor VS  -	Will discuss with attending

## 2017-10-19 NOTE — PROGRESS NOTE ADULT - SUBJECTIVE AND OBJECTIVE BOX
Patient seen and examined. No acute events.     Phys Exam  VSS, Tm 100.3  NAD, AOx3  EOM intact, PERRLA  Left V2 hypoesthesia (stable from preop), otherwise CNV intact  Left facial swelling overlying maxilla, no overlying erythema  Nose: bilateral nares patent, no active bleeding or discharge, no clear rhinorrhea  OC/OP: tongue within normal limits, FOM soft, obturator secured in place, incision c/d/I, posterior OP with dried blood, no active bleeding, no pooling of blood, no postnasal drip  Neck: soft, flat, no LAD

## 2017-10-19 NOTE — PROGRESS NOTE ADULT - SUBJECTIVE AND OBJECTIVE BOX
Post op Check  Patient seen and examined. Reports left facial pain, left jaw pain and neck discomfort that is controlled with pain medication. Breathing comfortably. No bleeding from mouth. No PO yet.    Phys Exam  VSS  NAD, AOx3  EOM intact, PERRLA  Left V2 hypoesthesia (stable from preop), otherwise CNV intact  Left facial swelling overlying maxilla, no overlying erythema  Nose: bilateral nares patent, no active bleeding or discharge, no clear rhinorrhea  OC/OP: tongue within normal limits, FOM soft, obturator secured in place, incision c/d/I, posterior OP with dried blood, no active bleeding, no pooling of blood, no postnasal drip  Neck: soft, flat, no LAD

## 2017-10-19 NOTE — DISCHARGE NOTE ADULT - MEDICATION SUMMARY - MEDICATIONS TO TAKE
I will START or STAY ON the medications listed below when I get home from the hospital:    oxyCODONE-acetaminophen 5 mg-325 mg oral tablet  -- 1 tab(s) by mouth every 6 hours, As needed, Moderate Pain (4 - 6) MDD:4  -- Indication: For Pain    chlorhexidine 0.12% mucous membrane liquid  -- 15 milliliter(s) mucous membrane 3 times a day   -- Indication: For oral care    lansoprazole 30 mg oral delayed release capsule  -- 1 cap(s) by mouth once a day, As Needed  -- Indication: For GERD I will START or STAY ON the medications listed below when I get home from the hospital:    oxyCODONE-acetaminophen 5 mg-325 mg oral tablet  -- 1 tab(s) by mouth every 6 hours, As needed, Moderate Pain (4 - 6) MDD:4  -- Indication: For pain    chlorhexidine 0.12% mucous membrane liquid  -- 15 milliliter(s) mucous membrane 3 times a day   -- Indication: For oral care    lansoprazole 30 mg oral delayed release capsule  -- 1 cap(s) by mouth once a day, As Needed  -- Indication: For GERD

## 2017-10-19 NOTE — PROGRESS NOTE ADULT - SUBJECTIVE AND OBJECTIVE BOX
36y Male 1 day s/p maxillectomy and placement of maxillary obturator.  Patient examined at bedside.  NEO overnight. Patient states pain is well controlled.  Denies any fever, chills, nausea, vomiting.  Patient is tolerating p.o. intake and voiding. Maxillary obturator prosthesis in place, intact, and stable.    Vital Signs Last 24 Hrs  T(C): 37.4 (19 Oct 2017 06:04), Max: 37.9 (19 Oct 2017 02:26)  T(F): 99.3 (19 Oct 2017 06:04), Max: 100.3 (19 Oct 2017 02:26)  HR: 85 (19 Oct 2017 06:04) (65 - 86)  BP: 138/89 (19 Oct 2017 06:04) (121/98 - 155/69)  BP(mean): --  RR: 18 (19 Oct 2017 06:04) (12 - 25)  SpO2: 96% (19 Oct 2017 06:04) (94% - 100%)    PE:   Gen: AAOx3, NAD  EOE: moderate midface edema, no extraoral erythema  IOE: Maxillary obturator intact, in place, and in stable position.  Sutures C/D/I.  Wounds hemostatic.              I&O's Summary    18 Oct 2017 07:01  -  19 Oct 2017 07:00  --------------------------------------------------------  IN: 1840 mL / OUT: 2400 mL / NET: -560 mL          A/P:  36y Male 1 day s/p left maxillectomy and placement of obturator. Patient recovering well.  - Care as per primary team  - OMFS will follow

## 2017-10-19 NOTE — DISCHARGE NOTE ADULT - CARE PLAN
Principal Discharge DX:	Cancer of maxillary sinus  Goal:	maxillectomy  Instructions for follow-up, activity and diet:	soft Principal Discharge DX:	Cancer of maxillary sinus  Goal:	maxillectomy  Instructions for follow-up, activity and diet:	full liquid to puree as tolerates Principal Discharge DX:	Cancer of maxillary sinus  Goal:	maxillectomy  Instructions for follow-up, activity and diet:	full liquid

## 2017-10-19 NOTE — DISCHARGE NOTE ADULT - PATIENT PORTAL LINK FT
“You can access the [facility] Patient Portal, offered by [facility], by registering with the following website: [email or web address]”

## 2017-10-24 ENCOUNTER — APPOINTMENT (OUTPATIENT)
Dept: OTOLARYNGOLOGY | Facility: CLINIC | Age: 37
End: 2017-10-24
Payer: COMMERCIAL

## 2017-10-24 VITALS — DIASTOLIC BLOOD PRESSURE: 81 MMHG | SYSTOLIC BLOOD PRESSURE: 129 MMHG | HEART RATE: 90 BPM

## 2017-10-24 PROCEDURE — 99024 POSTOP FOLLOW-UP VISIT: CPT

## 2017-10-24 PROCEDURE — 31231 NASAL ENDOSCOPY DX: CPT | Mod: 58

## 2017-10-26 ENCOUNTER — APPOINTMENT (OUTPATIENT)
Dept: OTOLARYNGOLOGY | Facility: CLINIC | Age: 37
End: 2017-10-26

## 2017-11-07 ENCOUNTER — APPOINTMENT (OUTPATIENT)
Dept: OTOLARYNGOLOGY | Facility: CLINIC | Age: 37
End: 2017-11-07
Payer: COMMERCIAL

## 2017-11-07 VITALS
HEART RATE: 74 BPM | DIASTOLIC BLOOD PRESSURE: 85 MMHG | HEIGHT: 74 IN | BODY MASS INDEX: 37.22 KG/M2 | WEIGHT: 290 LBS | SYSTOLIC BLOOD PRESSURE: 129 MMHG

## 2017-11-07 PROCEDURE — 99024 POSTOP FOLLOW-UP VISIT: CPT

## 2017-11-07 PROCEDURE — 31231 NASAL ENDOSCOPY DX: CPT | Mod: 58

## 2018-01-30 ENCOUNTER — APPOINTMENT (OUTPATIENT)
Dept: OTOLARYNGOLOGY | Facility: CLINIC | Age: 38
End: 2018-01-30
Payer: COMMERCIAL

## 2018-01-30 VITALS
SYSTOLIC BLOOD PRESSURE: 117 MMHG | WEIGHT: 290 LBS | HEART RATE: 67 BPM | DIASTOLIC BLOOD PRESSURE: 78 MMHG | BODY MASS INDEX: 37.22 KG/M2 | HEIGHT: 74 IN

## 2018-01-30 PROCEDURE — 31231 NASAL ENDOSCOPY DX: CPT

## 2018-01-30 PROCEDURE — 99214 OFFICE O/P EST MOD 30 MIN: CPT | Mod: 25

## 2018-01-30 RX ORDER — IBUPROFEN 400 MG/1
400 TABLET, FILM COATED ORAL
Refills: 0 | Status: COMPLETED | COMMUNITY
End: 2018-01-30

## 2018-03-18 NOTE — H&P ADULT. - LYMPH NODES
Problem: Potential for postpartum infection related to presence of episiotomy/vaginal tear and/or uterine contamination  Goal: Patient will be absent from signs and symptoms of infection  Outcome: PROGRESSING AS EXPECTED  Patient does not exhibit any signs/symptoms of infection. Will continue to monitor with Q6 hour checks and hourly rounding    Problem: Alteration in comfort related to episiotomy, vaginal repair and/or after birth pains  Goal: Patient verbalizes acceptable pain level  Outcome: PROGRESSING AS EXPECTED  Patient has verbalized acceptable pain level 1-3/10. Pain currently being managed with PRN medication orders. Will continue to monitor with Q6 hour checks and hourly rounding.        not examined

## 2018-04-20 ENCOUNTER — APPOINTMENT (OUTPATIENT)
Dept: CT IMAGING | Facility: CLINIC | Age: 38
End: 2018-04-20

## 2018-04-23 ENCOUNTER — APPOINTMENT (OUTPATIENT)
Dept: OTOLARYNGOLOGY | Facility: CLINIC | Age: 38
End: 2018-04-23
Payer: COMMERCIAL

## 2018-04-23 VITALS — DIASTOLIC BLOOD PRESSURE: 71 MMHG | HEART RATE: 64 BPM | SYSTOLIC BLOOD PRESSURE: 144 MMHG

## 2018-04-23 PROCEDURE — 31231 NASAL ENDOSCOPY DX: CPT

## 2018-04-23 PROCEDURE — 99214 OFFICE O/P EST MOD 30 MIN: CPT | Mod: 25

## 2018-07-31 PROBLEM — K21.9 GASTRO-ESOPHAGEAL REFLUX DISEASE WITHOUT ESOPHAGITIS: Chronic | Status: ACTIVE | Noted: 2017-10-03

## 2018-08-21 ENCOUNTER — APPOINTMENT (OUTPATIENT)
Dept: OTOLARYNGOLOGY | Facility: CLINIC | Age: 38
End: 2018-08-21
Payer: COMMERCIAL

## 2018-08-21 VITALS
SYSTOLIC BLOOD PRESSURE: 151 MMHG | HEIGHT: 74 IN | DIASTOLIC BLOOD PRESSURE: 95 MMHG | HEART RATE: 73 BPM | WEIGHT: 290 LBS | BODY MASS INDEX: 37.22 KG/M2

## 2018-08-21 PROCEDURE — 31231 NASAL ENDOSCOPY DX: CPT

## 2018-08-21 PROCEDURE — 99214 OFFICE O/P EST MOD 30 MIN: CPT | Mod: 25

## 2018-12-03 ENCOUNTER — APPOINTMENT (OUTPATIENT)
Dept: OTOLARYNGOLOGY | Facility: CLINIC | Age: 38
End: 2018-12-03

## 2018-12-17 ENCOUNTER — APPOINTMENT (OUTPATIENT)
Dept: OTOLARYNGOLOGY | Facility: CLINIC | Age: 38
End: 2018-12-17
Payer: COMMERCIAL

## 2018-12-17 VITALS
WEIGHT: 290 LBS | BODY MASS INDEX: 37.23 KG/M2 | DIASTOLIC BLOOD PRESSURE: 90 MMHG | HEART RATE: 72 BPM | SYSTOLIC BLOOD PRESSURE: 140 MMHG

## 2018-12-17 PROCEDURE — 31231 NASAL ENDOSCOPY DX: CPT

## 2018-12-17 PROCEDURE — 99213 OFFICE O/P EST LOW 20 MIN: CPT | Mod: 25

## 2019-04-29 ENCOUNTER — APPOINTMENT (OUTPATIENT)
Dept: OTOLARYNGOLOGY | Facility: CLINIC | Age: 39
End: 2019-04-29
Payer: COMMERCIAL

## 2019-04-29 VITALS
BODY MASS INDEX: 37.22 KG/M2 | WEIGHT: 290 LBS | DIASTOLIC BLOOD PRESSURE: 89 MMHG | SYSTOLIC BLOOD PRESSURE: 137 MMHG | HEART RATE: 73 BPM | HEIGHT: 74 IN

## 2019-04-29 PROCEDURE — 31231 NASAL ENDOSCOPY DX: CPT

## 2019-04-29 PROCEDURE — 99214 OFFICE O/P EST MOD 30 MIN: CPT | Mod: 25

## 2019-04-29 NOTE — HISTORY OF PRESENT ILLNESS
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 Pt has been following up with Dr. Quiroz for obturator.  Pt c/o leakage from L nostril when he puts his obturator in.  This happens when he eats and when his head is down.  The patient is wearing the permanent obturator.  Last saw Dr. Quiroz on 4/15/19.   Pt also c/o throat swelling.  Pt brought CD from [facility] from CT on 4/15/19.

## 2019-04-29 NOTE — PROCEDURE
[Post-Op Patency] : post-op patency [None] : none [Flexible Endoscope] : examined with the flexible endoscope [Serial Number: ___] : Serial Number: [unfilled] [FreeTextEntry6] : No lesions in the NC.  No drainage in the NC or from the O[facility] bilaterally.  No polyps.

## 2019-04-29 NOTE — PHYSICAL EXAM
[Nasal Endoscopy Performed] : nasal endoscopy was performed, see procedure section for findings [Normal] : no rashes [FreeTextEntry1] : Maxillectomy cavity filling in with soft tissue, with no purulence, no bleeding.  No concerning lesions in the OC/OPx on inspection or palpation.\par  [de-identified] : Expected L. facial edema resolved.

## 2019-04-29 NOTE — DATA REVIEWED
[de-identified] : CT Maxillofacial with moderate opacification of the L. maxillary sinus without obvious evidence of disease recurrence.

## 2019-04-29 NOTE — REASON FOR VISIT
[Subsequent Evaluation] : a subsequent evaluation for [FreeTextEntry2] : left maxillary ameloblastoma

## 2019-06-27 NOTE — DISCHARGE NOTE ADULT - DISCHARGE TO
"Chief Complaint:  Chief Complaint   Patient presents with   â¢ Follow-up     3 month   â¢ Coronary Artery Disease   â¢ CHF   â¢ Hyperlipidemia       Vitals:  Visit Vitals  /54 (BP Location: [facility] [place] Patient Position: Sitting, Cuff Size: Large Adult)   Pulse 69   Resp 18   Ht 5' 7"" (1.702 m)   Wt 107.5 kg   SpO2 96%   BMI 37.12 kg/mÂ²       HISTORY OF PRESENT ILLNESS     HPI:  Blanca Ardon is a 79year old female patient who presents to the clinic today for a follow up. Her main complaint is shortness of breath with exertion such as going up the stairs. She feels this is worse recently since discharge from cardiac rehabilitation. She has a gym membership but is unable to exercise due to hip problems. Her physician is planning to have fluid removed from the hip in the future. Denies chest pain and pressure. She denies lightheadedness or dizziness, racing heart or palpitations. She denies swelling in the ankles and legs. She is having a cough that \""makes me cough a lung up\"". She called herpulmonary doctor about this, and will follow up with them.         Other significant problems:  Patient Active Problem List   Diagnosis   â¢ Anxiety and depression   â¢ CRF (chronic renal failure)   â¢ Diabetic retinopathy (CMS/[facility])   â¢ Diverticulosis   â¢ HLD (hyperlipidemia)   â¢ Hypothyroidism   â¢ Osteopenia   â¢ PUD (peptic ulcer disease)   â¢ Rheumatoid arthritis   â¢ Obstructive sleep apnea (adult) (pediatric)   â¢ Restless legs syndrome (RLS)   â¢ Chronic diarrhea   â¢ CAD (coronary artery disease), native coronary artery   â¢ AVNRT (AV adali re-entry tachycardia) (CMS/[facility])   â¢ Type I (juvenile type) diabetes mellitus without mention of complication, uncontrolled   â¢ Chest pain   â¢ Bronchitis   â¢ Respiratory failure with hypoxia (CMS/[facility])   â¢ SIRS (systemic inflammatory response syndrome) (CMS/[facility])   â¢ Positive D dimer   â¢ Rhinitis   â¢ Cough   â¢ Chronic fatigue   â¢ Fever   â¢ Chronic back pain   â¢ Spinal stenosis of lumbar region without " neurogenic claudication   â¢ Lumbar facet joint pain   â¢ Heart palpitations   â¢ Postlaminectomy syndrome, lumbar region   â¢ Osteonecrosis of both hips (CMS/HCC)   â¢ Carpal tunnel syndrome of right wrist   â¢ Hypertrophic cardiomyopathy (CMS/HCC)   â¢ S/P CABG x 2   â¢ S/P tricuspid valve repair   â¢ Encounter for monitoring Coumadin therapy   â¢ History of total right hip replacement   â¢ Chronic bilateral low back pain without sciatica   â¢ Lumbar facet arthropathy   â¢ Myofascial pain   â¢ Chronic diastolic heart failure (CMS/HCC)   â¢ Ulcer of toe of right foot (CMS/HCC)   â¢ Herpes zoster with complication         PAST MEDICAL, FAMILY AND SOCIAL HISTORY     Medications:  Current Outpatient Medications   Medication Sig Dispense Refill   â¢ BYSTOLIC 5 MG tablet TAKE 1 TABLET BY MOUTH DAILY 90 tablet 1   â¢ insulin aspart (NOVOLOG) 100 UNIT/ML injectable solution USE PER SLIDING SCALE. MAX DOSE  UNITS VIA INSULIN PUMP 30 mL 1   â¢ blood glucose (AD CONTOUR NEXT TEST) test strip Test blood sugar 4-6 times daily as directed. Diagnosis: e11.9 800 strip 3   â¢ clonazePAM (KLONOPIN) 0.5 MG tablet Take 1 tablet by mouth at bedtime. Do not start before May 28, 2019. 28 tablet 0   â¢ calcium acetate, Phos Binder, 667 MG capsule Take 1 capsule by mouth daily. 90 capsule 3   â¢ Capsule estadiol 0.5mg testosterone 1mg capsule Take 1 capsule by mouth daily. 30 g 2   â¢ ezetimibe (ZETIA) 10 MG tablet TAKE 1 TABLET BY MOUTH DAILY 90 tablet 0   â¢ diphenhydrAMINE HCl (BENADRYL ALLERGY PO)      â¢ pramipexole (MIRAPEX) 0.25 MG tablet Take 2 tablets by mouth nightly. 2-3 hours prior to bedtime. 180 tablet 1   â¢ HYDROcodone-acetaminophen (NORCO) 5-325 MG per tablet 1 tablet daily as needed for pain 15 tablet 0   â¢ disopyramide (NORPACE) 100 MG capsule Take 1 capsule by mouth 2 times daily. 60 capsule 5   â¢ Netarsudil Dimesylate (RHOPRESSA) 0.02 % Solution Apply to eye nightly.      â¢ paricalcitol (ZEMPLAR) 1 MCG capsule Take 1 capsule by mouth "daily. Take three times per week on Mondays, Wednesdays, and Fridays at bedtime. 30 capsule 3   â¢ escitalopram (LEXAPRO) 20 MG tablet TAKE 1 AND 1/2 TABLETS BY MOUTH DAILY 45 tablet 5   â¢ furosemide (LASIX) 40 MG tablet TAKE 2 TABLETS BY MOUTH 3 TIMES A WEEK 72 tablet 1   â¢ gabapentin (NEURONTIN) 300 MG capsule TAKE 1 CAPSULE BY MOUTH THREE TIMES DAILY 90 capsule 5   â¢ Ferrous Sulfate (IRON) 325 (65 Fe) MG Tab Take by mouth daily. â¢ levothyroxine (SYNTHROID, LEVOTHROID) 75 MCG tablet Take 1 tablet by mouth daily. 90 tablet 3   â¢ nystatin (MYCOSTATIN) 740745 UNIT/GM ointment Apply topically 2 times daily. 30 g 1   â¢ aspirin 81 MG tablet Take 81 mg by mouth daily. â¢ hydroCORTisone (ANUSOL-HC) 25 MG suppository Place 1 suppository rectally 2 times daily. 12 each 3   â¢ LUMIGAN 0.01 % ophthalmic solution 1 drop NIGHTLY in both eyes 2.5 mL 6   â¢ Cholecalciferol (VITAMIN D3) 2000 units Tab Take 1 tablet by mouth daily. 30 tablet    â¢ Artificial Tear Ointment (REFRESH P.M. OP) Place into both eyes as needed. â¢ diphenoxylate-atropine (LOMOTIL) 2.5-0.025 MG tablet TAKE UP TO 6 TABLETS BY MOUTH DAILY 180 tablet 0   â¢ Insulin Syringes, Disposable, U-100 1 ML Misc Use TID as directed 100 each 3   â¢ omeprazole (PRILOSEC) 20 MG capsule Take 20 mg by mouth 2 times daily. â¢ DISPENSE Insulin syringes 30 g x 1/2 "". To use for insulin injection if pump is not working. 1 Container 1   â¢ albuterol-ipratropium 2.5 mg/0.5 mg (DUONEB) 0.5-2.5 (3) MG/3ML nebulizer solution Take 3 mLs by nebulization every 6 hours as needed for Wheezing. 360 mL 5   â¢ vitamin - therapeutic multivitamins w/minerals (CENTRUM SILVER,THERA-M) Tab Take 1 tablet by mouth daily. â¢ Vitamin D, Ergocalciferol, 72566 units capsule TAKE 1 CAPSULE BY MOUTH FOR 1 DAY A WEEK 12 capsule 0   â¢ triamcinolone (ARISTOCORT) 0.1 % cream Apply topically 3 times daily.  45 g 1   â¢ nitroGLYcerin (NITROSTAT) 0.4 MG sublingual tablet Place 1 tablet under the tongue " every 5 minutes as needed for Chest pain. 10 tablet 20   â¢ metOLazone (ZAROXOLYN) 2.5 MG tablet Take 1 tablet by mouth as needed (weight gain or increased LE edema or shortness of breath. Call cardiology clinic if you require a dose). 12 tablet 0   â¢ amoxicillin (AMOXIL) 500 MG capsule Take 6 tablets one hour prior to dental visit then take 3 tablets six hours after dental visit 9 capsule 3     No current facility-administered medications for this visit. Allergies:  ALLERGIES:   Allergen Reactions   â¢ Xyzal Other (See Comments)     Nightmares    â¢ Adhesive   (Environmental) Other (See Comments)     Removes skin when tape removed (plastic or adhesive).   Needs PAPER tape or hypoallergenic  Skin tears off   â¢ Calcitriol PRURITUS     Welts   â¢ Doxycycline Hyclate PRURITUS     Severe itching   â¢ Statins MYALGIA     Severe muscle pain   â¢ Tizanidine DEPRESSION   â¢ Tramadol DIARRHEA and Nausea & Vomiting     chills   â¢ Sulfa Drugs Cross Reactors RASH       Past Medical History/Surgeries:  Past Medical History:   Diagnosis Date   â¢ Anemia 1985     trransfusion with Hysterectomy   â¢ Anxiety    â¢ AVNRT (AV adali re-entry tachycardia) (CMS/[facility]) 9/26/2014    Status post ablation for AVNRT by Dr. Afshan Elizondo in [facility]   â¢ CAD (coronary artery disease), native coronary artery 9/26/2014    Status post stent 9/3/2014, Repeat stent 1/16/2015   â¢ Cervical radiculopathy    â¢ Chronic diarrhea     On Lomotil   â¢ Chronic fatigue 8/27/2015   â¢ CKD (chronic kidney disease), stage III (CMS/[facility])    â¢ Congestive cardiac failure (CMS/[facility])    â¢ Depression    â¢ Diverticulosis    â¢ Esophageal reflux    â¢ Essential (primary) hypertension    â¢ Heart palpitations 9/27/2016   â¢ HLD (hyperlipidemia)    â¢ [place] (hard of hearing)     Right Ear, No Hearing Aide   â¢ Hypothyroidism    â¢ IDDM (insulin dependent diabetes mellitus) (CMS/[facility])     Has Insulin Pump, Checks Blood Sugars 6X/Day   â¢ Inflammatory bowel disease    â¢ MRSA (methicillin resistant Staphylococcus aureus)    â¢ Myocardial infarction (CMS/[facility]) 2015    NSTEMI   â¢ Obesity     Ht 67 in.    wt. 218   â¢ Obstructive sleep apnea     CPAP will start using 2018 - does not use all the time   â¢ Osteopenia    â¢ Other chronic pain     back, neck   â¢ RA (rheumatoid arthritis) (CMS/[facility])    â¢ S/P CABG x 2 2017   â¢ S/P tricuspid valve repair 2017   â¢ Staphylococcus aureus infection    â¢ SVT (supraventricular tachycardia) (CMS/[facility])    â¢ Unspecified otitis media     70 % hearing loss rt ear     â¢ Unspecified sinusitis (chronic)     perforated nasal septum during insertion of n/g tube in ER    â¢ Vasovagal syncope    â¢ Vitreous hemorrhage of right eye (CMS/[facility]) 2017   â¢ Wears prescription eyeglasses        Past Surgical History:   Procedure Laterality Date   â¢ Abdominal adhesion surgery      abd mass removed   â¢ Appendectomy     â¢ Arthrotomy,open repair meniscus  2010    right knee   â¢ Back surgery      Lumbar Fusion   â¢ Back surgery      Cervical Fusion   â¢ Cabg, artery-vein, single  2017    consisiting of left internal mammary artery to left anterior descending and saphenous vein graft to right coronary artery by Dr. Chucho Torres   â¢ Cardiac catherization  2015    Also: 2014  ([facility]) stents x4   â¢ Cardiac catherization  2016    stents patent   â¢ Cardiac surgery     â¢ Carpal tunnel release Bilateral  and     Bilateral   â¢ Carpal tunnel release Right 2017   â¢  section, classic  1979   â¢ Colon surgery      Colon Resection for Blockage   â¢ Echo heart resting w doppler & color flow  2017   â¢ Eye surgery Bilateral     bilateral laser, scleral buckle left eye,buckle removed,   â¢ Eye surgery  10/2012, 2013    right vitrectomy   â¢ Eye surgery  2013    right cataract with lens implant   â¢ Eye surgery  2006    left cataract with lens implant   â¢ Eye surgery Right 2017    Right Vitrectomy   â¢ Fasciotomy,elbow,w stripping  8/3/10    left   â¢ Finger surgery  01/27/2017    right ring and small finger trigger release   â¢ Foot neuroma surgery      Left X 2 2011, Right 8/2012, total of 6 foot surgery   â¢ Ganglion cyst excision  2011    Left wrist   â¢ Gynecologic cryosurgery      rt ovarian cyst removal   â¢ Hysterectomy  1985    CHARLENE   â¢ Inj transforam epidural lumbar/sacral     â¢ Joint replacement Right 02/26/2018    Right total hip arthroplasty w/ right abductor tendon repair by Dr. Rosa Shows at [facility]   â¢ Knee scope,diagnostic  03/2014    left knee scope, cartilege   â¢ Left heart cath  08/10/2017   â¢ Lumbar discectomy  3/6/09   â¢ Ptca with stent  09/03/2014    pLAD   â¢ Ptca with stent  01/16/2015    InStent stenosis /    â¢ Ptca with stent  09/24/2015    dLAD   â¢ Radiofrequency ablation  02/22/2017    lumbar L 2,3,4    â¢ Removal gallbladder  2007    abd mass removed   â¢ Shoulder surgery  2006    right, 90% tear in bicep   â¢ Tonsillectomy and adenoidectomy  1970   â¢ Tricuspid valvuloplasty  09/20/2017    consisting of repair with #30 Bonnita Tevin classic tricuspid annuloplasty ring per Dr. Ruby Deleon   â¢ Tubal ligation  1983       Family History:  Family History   Problem Relation Age of Onset   â¢ Arthritis Mother    â¢ Cancer Mother 80        chronic leukemia   â¢ Diabetes Father    â¢ Heart disease Father    â¢ Cancer Maternal Grandfather    â¢ Cancer Paternal Grandmother         breast    â¢ Cancer Maternal Aunt         lung    â¢ Arthritis Maternal Aunt    â¢ Hypertension Paternal Aunt        Social History:  Social History     Socioeconomic History   â¢ Marital status: /Civil Union     Spouse name: Not on file   â¢ Number of children: 1   â¢ Years of education: Not on file   â¢ Highest education level: Not on file   Occupational History     Employer: [id number]   â¢ Financial resource strain: Not on file   â¢ Food insecurity:     Worry: Not on file     Inability: Not on file "  â¢ Transportation needs:     Medical: Not on file     Non-medical: Not on file   Tobacco Use   â¢ Smoking status: Never Smoker   â¢ Smokeless tobacco: Never Used   Substance and Sexual Activity   â¢ Alcohol use: No   â¢ Drug use: No   â¢ Sexual activity: Not on file   Lifestyle   â¢ Physical activity:     Days per week: Not on file     Minutes per session: Not on file   â¢ Stress: Not on file   Relationships   â¢ Social connections:     Talks on phone: Not on file     Gets together: Not on file     Attends [employer] service: Not on file     Active member of club or organization: Not on file     Attends meetings of clubs or organizations: Not on file     Relationship status: Not on file   â¢ Intimate partner violence:     Fear of current or ex partner: Not on file     Emotionally abused: Not on file     Physically abused: Not on file     Forced sexual activity: Not on file   Other Topics Concern   â¢ Not on file   Social History Narrative   â¢ Not on file         REVIEW OF SYSTEMS     CONSTITUTIONAL:  Denies fever, chills, myalgias, or declining functional capacity. RESPIRATORY:  Denies wheezing, or sputum production. See HPI for symptoms. CARDIOVASCULAR:  Denies chest pain, dyspnea, or palpitations. EXTREMITIES:  Reports bilateral lower extremity edema. INTEGUMENT:  Denies rash. NEUROLOGICAL:  Denies lightheadedness, dizziness, or near syncopal events. PHYSICAL EXAM     VITAL SIGNS:    Visit Vitals  /54 (BP Location: [facility] [place], Patient Position: Sitting, Cuff Size: Large Adult)   Pulse 69   Resp 18   Ht 5' 7"" (1.702 m)   Wt 107.5 kg   SpO2 96%   BMI 37.12 kg/mÂ²     GENERAL:  Abdiel Parekh is a 79year old female who is well-developed and well nourished. She is in no acute distress, non-toxic appearance. HEENT:  Atraumatic, external ears without scars or lesion.  Nasal mucosa and  oropharynx moist, Eyes:  PERRL (Pupils equal, round, reactive to light), conjunctivae normal.  Left carotid:  No bruit, Right carotid:  " No bruit. RESPIRATORY:  No respiratory distress, normal breath sounds, no rales, wheezing or rhonchi. CARDIOVASCULAR:  Regular rate, regular rhythm, no murmurs, no gallops, no rubs. SKIN:  Well hydrated, warm, no rash. EXTREMITIES:  No clubbing, cyanosis or edema, radial and pedal pulses are adequate. NEUROLOGICAL:  Alert and oriented x3, CN (cranial nerves) 2-12 intact, no focal deficits noted. MUSCULOSKELETAL: Normal gait, moves all 4 extremities  PSYCHIATRIC:  Speech and behavior appropriate. Pertinent laboratory tests:  Lab Results   Component Value Date    SODIUM 141 06/27/2019    POTASSIUM 5.0 06/27/2019    CO2 26 06/27/2019    BUN 29 (H) 06/27/2019    CREATININE 1.55 (H) 06/27/2019    GFRNA 34 06/27/2019       Lab Results   Component Value Date    AST 20 06/05/2019    GPT 27 06/05/2019    ALKPT 124 (H) 06/05/2019    BILIRUBIN 0.5 06/05/2019       Lab Results   Component Value Date    GLUCOSE 100 (H) 06/27/2019    HGBA1C 8.4 (H) 06/05/2019       Lab Results   Component Value Date    WBC 6.4 06/05/2019    HCT 40.2 06/05/2019    HGB 12.7 06/05/2019     06/05/2019    INR 1.1 04/23/2019       Lab Results   Component Value Date    TSH 4.451 04/23/2019       Lab Results   Component Value Date    CHOLESTEROL 203 (H) 06/05/2019    HDL 40 (L) 06/05/2019    CALCLDL 114 06/05/2019    TRIGLYCERIDE 246 (H) 06/05/2019       Lab Results   Component Value Date    BUN 29 (H) 06/27/2019    CREATININE 1.55 (H) 06/27/2019    GFRNA 34 06/27/2019       Diagnostic tests reviewed:    4/23/19 12-Lead EKG:  Reviewed     7/9/18 Stress Test:  Reviewed    6/21/18 Echocardiogram:  Reviewed    8/10/17 Cardiac catheterization:  Reviewed       ASSESSMENT/PLAN     . CAD and Myocardial bridge- stable. s/p 2V CABG  9/2017. Patient denies any exertional chest pain or chest pressure. She reports dyspnea with activity, which has worsened since cardiac rehabilitation. NM stress completed 7/2018-negative for myocardial ischemia. Recommend continuation of aspirin and Zetia. Unfortunately, patient is intolerant to statin therapy. Echocardiogram ordered to follow up and assess any changes that may be related to symptoms. Â   2. Severe TR - stable. s/pÂ tricuspid valve repair #30 mm Hidalgo Alexys classic tricuspid annuloplasty ring (9/20/2017)Â with Dr. Migel Max. Last echo completed 6/2018 showing no significant valvular abnormalities. Echocardiogram ordered today. Â   3. Chronic diastolic heart failure - weights remain stable 235-237 lbs, slight weight decrease 109.7kg on 5/20/10 to 107.5 today. On exam today, no signs of decompensated heart failure. Continue current medication regimen. Â   4. Hypertension - stable, BP today 100/54. Denies any lightheaded or dizziness, continue with current medications. 5. Hyperlipidemia - stable,  6/5/19. intolerant to statin therapy. Continue with Zetia.     Micha Cochran  6/27/2019, 1:16 PM Home

## 2019-08-12 NOTE — H&P PST ADULT - BMI (KG/M2)
Note Text (......Xxx Chief Complaint.): This diagnosis correlates with the Other (Free Text): LN2 for diagnosis - comedone like openings clearly noted following LN2 Detail Level: Detailed 36.2

## 2020-02-17 NOTE — H&P PST ADULT - NS SC CAGE ALCOHOL CUT DOWN
Pt notified per Dr. Muriel Cheung note below. Pt mentioned that she will take it easy for now. She will try taking prednisone and Guaifenesin-codeine and if she doesn't feels better, will call again. no

## 2020-06-23 ENCOUNTER — APPOINTMENT (OUTPATIENT)
Dept: OTOLARYNGOLOGY | Facility: CLINIC | Age: 40
End: 2020-06-23
Payer: COMMERCIAL

## 2020-06-23 VITALS
BODY MASS INDEX: 37.22 KG/M2 | WEIGHT: 290 LBS | HEART RATE: 73 BPM | SYSTOLIC BLOOD PRESSURE: 122 MMHG | HEIGHT: 74 IN | DIASTOLIC BLOOD PRESSURE: 83 MMHG

## 2020-06-23 PROCEDURE — 31231 NASAL ENDOSCOPY DX: CPT

## 2020-06-23 PROCEDURE — 99214 OFFICE O/P EST MOD 30 MIN: CPT | Mod: 25

## 2020-06-23 NOTE — PHYSICAL EXAM
[Nasal Endoscopy Performed] : nasal endoscopy was performed, see procedure section for findings [Normal] : no rashes [FreeTextEntry1] : Maxillectomy cavity filling in with soft tissue, with no purulence, no bleeding.  No concerning lesions in the OC/OPx on inspection or palpation.\par  [de-identified] : Expected L. facial edema resolved.

## 2020-06-23 NOTE — HISTORY OF PRESENT ILLNESS
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 Pt has been following up with Dr. Quiroz for obturator. Pt c/o leakage from L nostril when he puts his obturator in. This happens when he eats and when his head is down. The patient is wearing the permanent obturator. \par Pt is looking into getting a zygomatic implant with Dr. Faheem Vines of [facility]. PT is here to discuss surgical options so they can make a decision.  PT needs a FESS.

## 2020-09-13 NOTE — ASU DISCHARGE PLAN (ADULT/PEDIATRIC). - NS AS DC DISCHARGE ACCOMPANY
Family Rec if pt to be intubated:  Kcals: 0687-0214 kcal/day ---> comparing 930-1083 kcal/day ([id number] of 1548) v.s. 3207-2835 kcal/day (22-25 kcal/kg of IBW) v.s. 968-1232 kcal/day (11-14 kcal/kg of ABW)  Rec if pt to be extubated:  2078-2394 kcal/day (MSJ x 1.1-1.2)- BMI considered  Protein: 90-100g/day (1.8-2.0 g/kg of IBW) if pt to be intubated;  65-80g/day (1.3-1.6 g/kg of IBW) if extubated.  Fluid: per SICU team

## 2020-09-24 DIAGNOSIS — Z01.818 ENCOUNTER FOR OTHER PREPROCEDURAL EXAMINATION: ICD-10-CM

## 2020-09-27 ENCOUNTER — APPOINTMENT (OUTPATIENT)
Dept: DISASTER EMERGENCY | Facility: CLINIC | Age: 40
End: 2020-09-27

## 2020-09-28 ENCOUNTER — OUTPATIENT (OUTPATIENT)
Dept: OUTPATIENT SERVICES | Facility: HOSPITAL | Age: 40
LOS: 1 days | End: 2020-09-28

## 2020-09-28 VITALS
TEMPERATURE: 97 F | SYSTOLIC BLOOD PRESSURE: 132 MMHG | OXYGEN SATURATION: 98 % | RESPIRATION RATE: 16 BRPM | HEIGHT: 73 IN | WEIGHT: 315 LBS | DIASTOLIC BLOOD PRESSURE: 90 MMHG | HEART RATE: 67 BPM

## 2020-09-28 DIAGNOSIS — J32.0 CHRONIC MAXILLARY SINUSITIS: ICD-10-CM

## 2020-09-28 DIAGNOSIS — J34.89 OTHER SPECIFIED DISORDERS OF NOSE AND NASAL SINUSES: Chronic | ICD-10-CM

## 2020-09-28 DIAGNOSIS — S86.012A STRAIN OF LEFT ACHILLES TENDON, INITIAL ENCOUNTER: Chronic | ICD-10-CM

## 2020-09-28 DIAGNOSIS — Z11.59 ENCOUNTER FOR SCREENING FOR OTHER VIRAL DISEASES: ICD-10-CM

## 2020-09-28 DIAGNOSIS — L72.9 FOLLICULAR CYST OF THE SKIN AND SUBCUTANEOUS TISSUE, UNSPECIFIED: Chronic | ICD-10-CM

## 2020-09-28 DIAGNOSIS — M27.40 UNSPECIFIED CYST OF JAW: Chronic | ICD-10-CM

## 2020-09-28 LAB
HCT VFR BLD CALC: 44.1 % — SIGNIFICANT CHANGE UP (ref 39–50)
HGB BLD-MCNC: 14.8 G/DL — SIGNIFICANT CHANGE UP (ref 13–17)
MCHC RBC-ENTMCNC: 30 PG — SIGNIFICANT CHANGE UP (ref 27–34)
MCHC RBC-ENTMCNC: 33.6 % — SIGNIFICANT CHANGE UP (ref 32–36)
MCV RBC AUTO: 89.3 FL — SIGNIFICANT CHANGE UP (ref 80–100)
NRBC # FLD: 0 K/UL — SIGNIFICANT CHANGE UP (ref 0–0)
PLATELET # BLD AUTO: 219 K/UL — SIGNIFICANT CHANGE UP (ref 150–400)
PMV BLD: 10.2 FL — SIGNIFICANT CHANGE UP (ref 7–13)
RBC # BLD: 4.94 M/UL — SIGNIFICANT CHANGE UP (ref 4.2–5.8)
RBC # FLD: 14.3 % — SIGNIFICANT CHANGE UP (ref 10.3–14.5)
SARS-COV-2 N GENE NPH QL NAA+PROBE: NOT DETECTED
WBC # BLD: 6.06 K/UL — SIGNIFICANT CHANGE UP (ref 3.8–10.5)
WBC # FLD AUTO: 6.06 K/UL — SIGNIFICANT CHANGE UP (ref 3.8–10.5)

## 2020-09-28 RX ORDER — LANSOPRAZOLE 15 MG/1
1 CAPSULE, DELAYED RELEASE ORAL
Qty: 0 | Refills: 0 | DISCHARGE

## 2020-09-28 NOTE — H&P PST ADULT - NSICDXPASTMEDICALHX_GEN_ALL_CORE_FT
PAST MEDICAL HISTORY:  Ameloblastoma     GERD (gastroesophageal reflux disease)      PAST MEDICAL HISTORY:  Ameloblastoma     Chronic maxillary sinusitis     GERD (gastroesophageal reflux disease)     Morbid obesity

## 2020-09-28 NOTE — H&P PST ADULT - NSICDXPASTSURGICALHX_GEN_ALL_CORE_FT
PAST SURGICAL HISTORY:  Achilles rupture, left 2010 repair    Cyst of skin back 4/5/2017    Jaw cyst s/p enucleation cyst left maxilla buccal fat flap 4/2017     PAST SURGICAL HISTORY:  Achilles rupture, left 2010 repair    Cyst of skin back 4/5/2017    Jaw cyst s/p enucleation cyst left maxilla buccal fat flap 4/2017    Maxillary sinus mass excision 2017

## 2020-09-28 NOTE — H&P PST ADULT - NEGATIVE NEUROLOGICAL SYMPTOMS
no syncope/no vertigo/no headache/no focal seizures/no transient paralysis/no tremors/no paresthesias/no weakness

## 2020-09-28 NOTE — H&P PST ADULT - NSICDXPROBLEM_GEN_ALL_CORE_FT
PROBLEM DIAGNOSES  Problem: Chronic maxillary sinusitis  Assessment and Plan: Scheduled for endoscopic left maxillary antrostomy on 9/30/2020  Written & verbal preop instructions, gi prophylaxis   Pt verbalized good understanding.   Liang precautions recommended.  OR booking notified via fax.      Problem: Encounter for laboratory testing for COVID-19 virus  Assessment and Plan: Done on 9/27/2020 per pt

## 2020-09-28 NOTE — H&P PST ADULT - NEGATIVE GENERAL GENITOURINARY SYMPTOMS
normal urinary frequency/no flank pain R/no bladder infections/no hematuria/no incontinence/no dysuria/no urinary hesitancy/no flank pain L

## 2020-09-28 NOTE — H&P PST ADULT - NEGATIVE ALLERGY TYPES
no reactions to food/no outdoor environmental allergies/no indoor environmental allergies/no reactions to animals/no reactions to medicines

## 2020-09-28 NOTE — H&P PST ADULT - NEGATIVE MUSCULOSKELETAL SYMPTOMS
no arthritis/no joint swelling/no muscle weakness/no stiffness/no myalgia/no muscle cramps/no neck pain

## 2020-09-28 NOTE — H&P PST ADULT - HISTORY OF PRESENT ILLNESS
38y/o male presents for preop eval for scheduled endoscopic left maxillary antrostomy on 9/30/2020.  Pt with h/o chronic maxillary sinusitis.  Denies recent infection.

## 2020-09-28 NOTE — H&P PST ADULT - NEGATIVE ENMT SYMPTOMS
no vertigo/no gum bleeding/no tinnitus/no nasal discharge/no post-nasal discharge/no nose bleeds/no hearing difficulty/no ear pain/no sinus symptoms/no nasal obstruction/no dysphagia/no nasal congestion no vertigo/no tinnitus/no nasal discharge/no post-nasal discharge/no gum bleeding/no ear pain/no nose bleeds/no hearing difficulty/no dysphagia/no nasal obstruction

## 2020-09-29 ENCOUNTER — TRANSCRIPTION ENCOUNTER (OUTPATIENT)
Age: 40
End: 2020-09-29

## 2020-09-29 NOTE — ASU PATIENT PROFILE, ADULT - PSH
Achilles rupture, left  2010 repair  Cyst of skin  back 4/5/2017  Jaw cyst  s/p enucleation cyst left maxilla buccal fat flap 4/2017  Maxillary sinus mass  excision 2017

## 2020-09-30 ENCOUNTER — APPOINTMENT (OUTPATIENT)
Dept: OTOLARYNGOLOGY | Facility: HOSPITAL | Age: 40
End: 2020-09-30

## 2020-09-30 ENCOUNTER — OUTPATIENT (OUTPATIENT)
Dept: OUTPATIENT SERVICES | Facility: HOSPITAL | Age: 40
LOS: 1 days | Discharge: HOME | End: 2020-09-30
Payer: COMMERCIAL

## 2020-09-30 ENCOUNTER — RESULT REVIEW (OUTPATIENT)
Age: 40
End: 2020-09-30

## 2020-09-30 VITALS
SYSTOLIC BLOOD PRESSURE: 135 MMHG | HEART RATE: 65 BPM | TEMPERATURE: 98 F | WEIGHT: 315 LBS | OXYGEN SATURATION: 95 % | RESPIRATION RATE: 20 BRPM | DIASTOLIC BLOOD PRESSURE: 87 MMHG | HEIGHT: 74 IN

## 2020-09-30 VITALS
SYSTOLIC BLOOD PRESSURE: 117 MMHG | TEMPERATURE: 97 F | OXYGEN SATURATION: 100 % | DIASTOLIC BLOOD PRESSURE: 86 MMHG | RESPIRATION RATE: 14 BRPM | HEART RATE: 71 BPM

## 2020-09-30 DIAGNOSIS — S86.012A STRAIN OF LEFT ACHILLES TENDON, INITIAL ENCOUNTER: Chronic | ICD-10-CM

## 2020-09-30 DIAGNOSIS — J32.0 CHRONIC MAXILLARY SINUSITIS: ICD-10-CM

## 2020-09-30 DIAGNOSIS — J34.89 OTHER SPECIFIED DISORDERS OF NOSE AND NASAL SINUSES: Chronic | ICD-10-CM

## 2020-09-30 DIAGNOSIS — L72.9 FOLLICULAR CYST OF THE SKIN AND SUBCUTANEOUS TISSUE, UNSPECIFIED: Chronic | ICD-10-CM

## 2020-09-30 DIAGNOSIS — M27.40 UNSPECIFIED CYST OF JAW: Chronic | ICD-10-CM

## 2020-09-30 PROCEDURE — 31256 EXPLORATION MAXILLARY SINUS: CPT | Mod: GC,LT

## 2020-09-30 PROCEDURE — 88311 DECALCIFY TISSUE: CPT | Mod: 26

## 2020-09-30 PROCEDURE — 88305 TISSUE EXAM BY PATHOLOGIST: CPT | Mod: 26

## 2020-09-30 RX ORDER — OXYCODONE HYDROCHLORIDE 5 MG/1
5 TABLET ORAL
Qty: 5 | Refills: 0
Start: 2020-09-30

## 2020-09-30 RX ORDER — ACETAMINOPHEN 500 MG
650 TABLET ORAL
Qty: 26000 | Refills: 0
Start: 2020-09-30 | End: 2020-10-09

## 2020-09-30 RX ORDER — OXYCODONE HYDROCHLORIDE 5 MG/1
5 TABLET ORAL ONCE
Refills: 0 | Status: DISCONTINUED | OUTPATIENT
Start: 2020-09-30 | End: 2020-09-30

## 2020-09-30 RX ORDER — FENTANYL CITRATE 50 UG/ML
25 INJECTION INTRAVENOUS
Refills: 0 | Status: DISCONTINUED | OUTPATIENT
Start: 2020-09-30 | End: 2020-09-30

## 2020-09-30 NOTE — ASU DISCHARGE PLAN (ADULT/PEDIATRIC) - CALL YOUR DOCTOR IF YOU HAVE ANY OF THE FOLLOWING:
Pain not relieved by Medications/Nausea and vomiting that does not stop/Unable to urinate/Inability to tolerate liquids or foods/Bleeding that does not stop/Fever greater than (need to indicate Fahrenheit or Celsius)/Wound/Surgical Site with redness, or foul smelling discharge or pus

## 2020-09-30 NOTE — ASU DISCHARGE PLAN (ADULT/PEDIATRIC) - ASU DC SPECIAL INSTRUCTIONSFT
[facility] sinus rinses three times a day  Tylenol for pain every 6 hours. Oxycodone for breakthrough pain

## 2020-09-30 NOTE — ASU DISCHARGE PLAN (ADULT/PEDIATRIC) - CARE PROVIDER_API CALL
Lucía Dev  OTOLARYNGOLOGY  [address] [place] [address]  Phone: [phone]  Fax: [phone]  Follow Up Time:

## 2020-09-30 NOTE — PACU DISCHARGE NOTE - MENTAL STATUS: PATIENT PARTICIPATION
Assessment and Plan:   ESRD: HD today for UF and clearance. Using R CVC. UO remains about 1 L per day. She is on IV bumex: will change to po. Getting EPO on the run. Also on vit D, given high PTH and consider hectorol with future runs. Goal of 2-3 L UF. On dialysis diet.             Interval History:   Resp failure: pneumonia, extubated, on Bipap.   CV: on metoprolol.                  Review of Systems:   No sx on dialysis.           Medications:       epoetin hira (EPOGEN,PROCRIT) inj ESRD  4,000 Units Intravenous See Admin Instructions     pantoprazole  40 mg Oral QAM     insulin glargine  5 Units Subcutaneous QAM AC     metoprolol  50 mg Oral BID     insulin aspart  1-7 Units Subcutaneous Q4H     ipratropium - albuterol 0.5 mg/2.5 mg/3 mL  1 vial Nebulization TID     pentoxifylline  400 mg Oral Daily     sodium chloride (PF)  10 mL Intracatheter Q8H     bumetanide  2 mg Intravenous Q8H     cholecalciferol  5,000 Units Oral Daily     calcitRIOL  0.25 mcg Oral Daily     heparin  5,000 Units Subcutaneous Q8H     - MEDICATION INSTRUCTIONS for Dialysis Patients -   Does not apply See Admin Instructions       heparin (porcine) 500 Units/hr (10/11/17 0836)     Warfarin Therapy Reminder       IV fluid REPLACEMENT ONLY       Current active medications and PTA medications reviewed, see medication list for details.            Physical Exam:   Vitals were reviewed  Patient Vitals for the past 24 hrs:   BP Temp Temp src Heart Rate Resp SpO2   10/11/17 0915 115/70 - - 87 24 -   10/11/17 0900 94/59 - - 82 23 -   10/11/17 0845 108/67 - - 79 20 -   10/11/17 0830 115/71 - - 76 22 98 %   10/11/17 0815 131/72 - - 79 (!) 51 -   10/11/17 0812 131/72 - - 81 12 -   10/11/17 0810 131/72 - - 81 25 -   10/11/17 0800 124/65 97.7  F (36.5  C) Axillary 83 15 93 %   10/11/17 0745 125/71 - - 81 14 -   10/11/17 0744 - - - - - 92 %   10/11/17 0730 - - - - - 91 %   10/11/17 0700 123/69 - - 82 19 97 %   10/11/17 0600 122/61 - - - - -    10/11/17 0500 118/65 - - 73 13 96 %   10/11/17 0400 115/64 97.1  F (36.2  C) Axillary 73 18 96 %   10/11/17 0300 124/68 - - - - 95 %   10/11/17 0245 - - - 66 17 95 %   10/11/17 0200 104/64 - - 69 20 97 %   10/11/17 0100 117/68 - - 68 21 96 %   10/11/17 0000 124/70 97.3  F (36.3  C) Axillary - - -   10/10/17 2345 - - - 79 20 (!) 85 %   10/10/17 2330 - - - 73 16 98 %   10/10/17 2300 - - - 76 - 100 %   10/10/17 2200 135/74 - - - - -   10/10/17 2145 - - - 78 26 100 %   10/10/17 2100 134/74 - - 92 26 94 %   10/10/17 2000 130/61 98.1  F (36.7  C) Oral 79 16 92 %   10/10/17 1900 121/59 - - - - 93 %   10/10/17 1800 113/59 98.2  F (36.8  C) Oral 73 19 92 %   10/10/17 1700 115/60 - - 72 18 99 %   10/10/17 1600 103/89 - - 79 14 98 %   10/10/17 1500 (!) 82/55 - - - - -   10/10/17 1400 91/53 - - - - -   10/10/17 1300 116/61 - - - - (!) 89 %   10/10/17 1238 - - - - - 95 %   10/10/17 1200 130/68 98.1  F (36.7  C) - 75 12 95 %   10/10/17 1100 114/71 - - 68 28 97 %   10/10/17 1000 92/58 - - 72 - -       Temp:  [97.1  F (36.2  C)-98.2  F (36.8  C)] 97.7  F (36.5  C)  Heart Rate:  [66-92] 87  Resp:  [12-51] 24  BP: ()/(53-89) 115/70  FiO2 (%):  [60 %-80 %] 80 %  SpO2:  [85 %-100 %] 98 %    Temperatures:  Current - Temp: 97.7  F (36.5  C); Max - Temp  Av.8  F (36.6  C)  Min: 97.1  F (36.2  C)  Max: 98.2  F (36.8  C)  Respiration range: Resp  Av.4  Min: 12  Max: 51  Pulse range: No Data Recorded  Blood pressure range: Systolic (24hrs), Av , Min:82 , Max:135   ; Diastolic (24hrs), Av, Min:53, Max:89    Pulse oximetry range: SpO2  Av.9 %  Min: 85 %  Max: 100 %    I/O last 3 completed shifts:  In: 600 [P.O.:560; I.V.:40]  Out: 1020 [Urine:1020]      Intake/Output Summary (Last 24 hours) at 10/11/17 0939  Last data filed at 10/11/17 0600   Gross per 24 hour   Intake              320 ml   Output              920 ml   Net             -600 ml       Resting in bed  R CVC with no redness or tenderness  ON  Awake bipap      I/O 760/1130, net neg 11.3 L since adm.        Wt Readings from Last 4 Encounters:   10/10/17 70.8 kg (156 lb 1.4 oz)   10/01/17 77.3 kg (170 lb 8 oz)   09/22/17 77.1 kg (170 lb)   09/21/17 76.2 kg (168 lb)          Data:          Lab Results   Component Value Date     10/11/2017     10/10/2017     10/09/2017    Lab Results   Component Value Date    CHLORIDE 110 10/11/2017    CHLORIDE 109 10/10/2017    CHLORIDE 107 10/09/2017    Lab Results   Component Value Date    BUN 18 10/11/2017    BUN 12 10/10/2017    BUN 29 10/09/2017      Lab Results   Component Value Date    POTASSIUM 3.7 10/11/2017    POTASSIUM 3.6 10/10/2017    POTASSIUM 3.0 10/09/2017    Lab Results   Component Value Date    CO2 28 10/11/2017    CO2 29 10/10/2017    CO2 31 10/09/2017    Lab Results   Component Value Date    CR 3.68 10/11/2017    CR 2.80 10/10/2017    CR 3.98 10/09/2017        Recent Labs   Lab Test  10/11/17   0420  10/10/17   0409  10/09/17   0441   WBC  8.4  9.3  7.9   HGB  8.6*  8.5*  6.9*   HCT  27.8*  27.5*  22.0*   MCV  90  90  88   PLT  152  129*  130*     Recent Labs   Lab Test  10/11/17   0420  10/10/17   0409  10/08/17   0411   AST  12  12  12   ALT  9  9  10   ALKPHOS  145  147  152*   BILITOTAL  0.3  0.3  0.4       Recent Labs   Lab Test  10/10/17   0409  10/09/17   0441  10/08/17   0411   MAG  1.7  2.1  2.0     Recent Labs   Lab Test  10/10/17   0409  10/09/17   0441  10/08/17   0411   PHOS  2.0*  3.4  3.1     Recent Labs   Lab Test  10/11/17   0420  10/10/17   0409  10/09/17   0441   MALICK  7.8*  7.7*  7.6*       Lab Results   Component Value Date    MALICK 7.8 (L) 10/11/2017     Lab Results   Component Value Date    WBC 8.4 10/11/2017    HGB 8.6 (L) 10/11/2017    HCT 27.8 (L) 10/11/2017    MCV 90 10/11/2017     10/11/2017     Lab Results   Component Value Date     (H) 10/11/2017    POTASSIUM 3.7 10/11/2017    CHLORIDE 110 (H) 10/11/2017    CO2 28 10/11/2017     (H) 10/11/2017      Lab Results   Component Value Date    BUN 18 10/11/2017    CR 3.68 (H) 10/11/2017     Lab Results   Component Value Date    MAG 1.7 10/10/2017     Lab Results   Component Value Date    PHOS 2.0 (L) 10/10/2017       Creatinine   Date Value Ref Range Status   10/11/2017 3.68 (H) 0.52 - 1.04 mg/dL Final   10/10/2017 2.80 (H) 0.52 - 1.04 mg/dL Final   10/09/2017 3.98 (H) 0.52 - 1.04 mg/dL Final   10/08/2017 3.28 (H) 0.52 - 1.04 mg/dL Final   10/07/2017 2.93 (H) 0.52 - 1.04 mg/dL Final   10/07/2017 3.51 (H) 0.52 - 1.04 mg/dL Final       Attestation:  I have reviewed today's vital signs, notes, medications, labs and imaging.  Seen on dialysis.      Hank Guerra MD

## 2020-10-02 PROBLEM — J32.0 CHRONIC MAXILLARY SINUSITIS: Chronic | Status: ACTIVE | Noted: 2020-09-28

## 2020-10-02 PROBLEM — E66.01 MORBID (SEVERE) OBESITY DUE TO EXCESS CALORIES: Chronic | Status: ACTIVE | Noted: 2020-09-28

## 2020-10-05 ENCOUNTER — APPOINTMENT (OUTPATIENT)
Dept: OTOLARYNGOLOGY | Facility: CLINIC | Age: 40
End: 2020-10-05
Payer: COMMERCIAL

## 2020-10-05 VITALS — TEMPERATURE: 97.5 F | BODY MASS INDEX: 40.43 KG/M2 | HEIGHT: 74 IN | WEIGHT: 315 LBS

## 2020-10-05 PROCEDURE — 99214 OFFICE O/P EST MOD 30 MIN: CPT | Mod: 25

## 2020-10-05 PROCEDURE — 31237 NSL/SINS NDSC SURG BX POLYPC: CPT | Mod: LT

## 2020-10-05 NOTE — REASON FOR VISIT
[Subsequent Evaluation] : a subsequent evaluation for [Spouse] : spouse [FreeTextEntry2] : post op visit.

## 2020-10-05 NOTE — PHYSICAL EXAM
[Nasal Endoscopy Performed] : nasal endoscopy was performed, see procedure section for findings [Normal] : no rashes [FreeTextEntry1] : Maxillectomy cavity filling in with soft tissue, with no purulence, no bleeding.  No concerning lesions in the OC/OPx on inspection or palpation.\par  [de-identified] : Expected L. facial edema resolved.

## 2020-10-05 NOTE — HISTORY OF PRESENT ILLNESS
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 Pt has been following up with Dr. Quiroz for obturator.  The patient is wearing the permanent obturator. Pt is planning for a zygomatic implant with Dr. Faheem Vines of [facility] suggesting post FESS.  last imaging in 7/2020, caro. PT is here today post  FESS on 9/30/2020, doing well, using Huber med rinse. no bleeding from nose. pt has mild left eye dry, no blurry vision.

## 2020-10-05 NOTE — PROCEDURE
[None] : none [Debridement] : debridement  [Rigid Endoscope] : examined with a rigid endoscope [Serial Number: ___] : Serial Number: [unfilled] [FreeTextEntry6] : No lesions in the NC. There is crusting along the site of the recent antrostomy that was debrided today.  There were some secretions in the sinus which were suctioned out.

## 2020-10-19 ENCOUNTER — APPOINTMENT (OUTPATIENT)
Dept: OTOLARYNGOLOGY | Facility: CLINIC | Age: 40
End: 2020-10-19
Payer: COMMERCIAL

## 2020-10-19 VITALS
SYSTOLIC BLOOD PRESSURE: 128 MMHG | WEIGHT: 315 LBS | BODY MASS INDEX: 40.43 KG/M2 | HEIGHT: 74 IN | TEMPERATURE: 97.5 F | DIASTOLIC BLOOD PRESSURE: 88 MMHG

## 2020-10-19 PROCEDURE — 99214 OFFICE O/P EST MOD 30 MIN: CPT | Mod: 25

## 2020-10-19 PROCEDURE — 99072 ADDL SUPL MATRL&STAF TM PHE: CPT

## 2020-10-19 PROCEDURE — 31231 NASAL ENDOSCOPY DX: CPT

## 2020-10-19 NOTE — HISTORY OF PRESENT ILLNESS
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 Pt has been following up with Dr. Quiroz for obturator.  The patient is wearing the permanent obturator. Pt is planning for a zygomatic implant with Dr. Faheem Vines of [facility] suggesting post FESS.  last imaging in 7/2020, caro. PT is  post  FESS on 9/30/2020, doing well, using Huber med rinse. no bleeding from nose. pt has mild left eye dry resolved, no blurry vision.  pt c.o right arm where IV site with some discomfort and swelling, no s.s of infection.

## 2020-10-19 NOTE — PHYSICAL EXAM
[Nasal Endoscopy Performed] : nasal endoscopy was performed, see procedure section for findings [de-identified] : Expected L. facial edema resolved. [FreeTextEntry1] : Maxillectomy cavity filling in with soft tissue, with no purulence, no bleeding.  No concerning lesions in the OC/OPx on inspection or palpation.\par  [Normal] : no rashes

## 2020-11-21 ENCOUNTER — TRANSCRIPTION ENCOUNTER (OUTPATIENT)
Age: 40
End: 2020-11-21

## 2020-12-22 ENCOUNTER — APPOINTMENT (OUTPATIENT)
Dept: OTOLARYNGOLOGY | Facility: CLINIC | Age: 40
End: 2020-12-22
Payer: COMMERCIAL

## 2020-12-22 VITALS
BODY MASS INDEX: 37.22 KG/M2 | DIASTOLIC BLOOD PRESSURE: 86 MMHG | HEART RATE: 74 BPM | SYSTOLIC BLOOD PRESSURE: 137 MMHG | HEIGHT: 74 IN | WEIGHT: 290 LBS

## 2020-12-22 PROCEDURE — 31231 NASAL ENDOSCOPY DX: CPT

## 2020-12-22 PROCEDURE — 99072 ADDL SUPL MATRL&STAF TM PHE: CPT

## 2020-12-22 PROCEDURE — 99214 OFFICE O/P EST MOD 30 MIN: CPT | Mod: 25

## 2020-12-22 NOTE — REASON FOR VISIT
[Subsequent Evaluation] : a subsequent evaluation for [Other: _____] : [unfilled] [FreeTextEntry2] : follow up for ameloblastoma

## 2020-12-22 NOTE — HISTORY OF PRESENT ILLNESS
[de-identified] : 40 year old male follow up for Left maxillary ameloblastoma, followed by Dr. Quirzo for obturator, pending zygomatic implant with Dr. Vines of [facility], s/p 9/30/2020 FESS, last imaging July 2020.  States no change in symptoms, clear rhinorrhea persistent, tends to worsen intermittently throughout the day, usually with the first meal of the day.  States continues to use Huber Med rinses in conjunction with Flonase with minimal relief.  Denies dyspnea, post nasal drip, sinus pain/pressure, recent fevers and infections.  Reports intermittent headaches, takes Ibuprofen as needed.

## 2020-12-22 NOTE — PHYSICAL EXAM
[Nasal Endoscopy Performed] : nasal endoscopy was performed, see procedure section for findings [Normal] : no rashes [FreeTextEntry1] : Maxillectomy cavity filling in with soft tissue, with no purulence, no bleeding.  No concerning lesions in the OC/OPx on inspection or palpation.\par  [de-identified] : Expected L. facial edema resolved.

## 2020-12-22 NOTE — PROCEDURE
[Recalcitrant Symptoms] : recalcitrant symptoms  [None] : none [Flexible Endoscope] : examined with the flexible endoscope [Serial Number: ___] : Serial Number: [unfilled] [FreeTextEntry6] : No lesions in the NC. There is no more crusting along the site of the recent antrostomy and the antrostomy appears patent but with some mucosal edema around it. No lesions in the NPx.\par

## 2021-01-06 ENCOUNTER — APPOINTMENT (OUTPATIENT)
Dept: OTOLARYNGOLOGY | Facility: CLINIC | Age: 41
End: 2021-01-06
Payer: COMMERCIAL

## 2021-01-06 VITALS
SYSTOLIC BLOOD PRESSURE: 122 MMHG | HEIGHT: 74 IN | WEIGHT: 290 LBS | DIASTOLIC BLOOD PRESSURE: 88 MMHG | BODY MASS INDEX: 37.22 KG/M2 | HEART RATE: 69 BPM

## 2021-01-06 PROCEDURE — 99213 OFFICE O/P EST LOW 20 MIN: CPT | Mod: 25

## 2021-01-06 PROCEDURE — 99072 ADDL SUPL MATRL&STAF TM PHE: CPT

## 2021-01-06 PROCEDURE — 31231 NASAL ENDOSCOPY DX: CPT

## 2021-01-06 NOTE — HISTORY OF PRESENT ILLNESS
[de-identified] : 40M referred by Dr. Campbell Castrejon for evaluation of chronic sinusitis\par S/p L infrastructure maxillectomy 10/18/2017 for ameloblastoma-- since then obturated\par Since then has dealt with persistent L sinonasal symptoms with imaging findings demonstrating L maxillectomy sinusitis\par S/p L maxillary antrostomy 9/30/20 with Dr. Castrejon but continues to have symptoms\par Has seen Dr. Vines for consideration of L zygomatic implant however concerned about proceeding with surgery with active surgery\par CT Maxifacial sinus 11/05/2020 ([facility])-- demonstrates L>R maxillary sinusitis, L ethmoid and frontal inflammation\par \par Symptoms: Reports left sided sinus pressure, clear anterior rhinorrhea.\par Denies nasal congestion, PND,\par Sense of smell is good\par Recurrent sinus infections: no\par Continues to use saline rinse and nasal sprays 2-3x daily since surgery.\par \par PMH:  Left maxillary ameloblastoma

## 2021-01-06 NOTE — PHYSICAL EXAM
[Nasal Endoscopy Performed] : nasal endoscopy was performed, see procedure section for findings [Midline] : trachea located in midline position [Normal] : no rashes [de-identified] : obturator in place

## 2021-01-12 ENCOUNTER — APPOINTMENT (OUTPATIENT)
Dept: FAMILY MEDICINE | Facility: CLINIC | Age: 41
End: 2021-01-12
Payer: COMMERCIAL

## 2021-01-12 VITALS
HEART RATE: 83 BPM | DIASTOLIC BLOOD PRESSURE: 82 MMHG | HEIGHT: 74 IN | WEIGHT: 300 LBS | TEMPERATURE: 97.7 F | SYSTOLIC BLOOD PRESSURE: 130 MMHG | OXYGEN SATURATION: 98 % | BODY MASS INDEX: 38.5 KG/M2 | RESPIRATION RATE: 17 BRPM

## 2021-01-12 DIAGNOSIS — Z11.3 ENCOUNTER FOR SCREENING FOR INFECTIONS WITH A PREDOMINANTLY SEXUAL MODE OF TRANSMISSION: ICD-10-CM

## 2021-01-12 DIAGNOSIS — Z00.00 ENCOUNTER FOR GENERAL ADULT MEDICAL EXAMINATION W/OUT ABNORMAL FINDINGS: ICD-10-CM

## 2021-01-12 DIAGNOSIS — Z11.59 ENCOUNTER FOR SCREENING FOR OTHER VIRAL DISEASES: ICD-10-CM

## 2021-01-12 PROCEDURE — 99072 ADDL SUPL MATRL&STAF TM PHE: CPT

## 2021-01-12 PROCEDURE — 36415 COLL VENOUS BLD VENIPUNCTURE: CPT

## 2021-01-12 PROCEDURE — 99386 PREV VISIT NEW AGE 40-64: CPT | Mod: 25

## 2021-01-13 LAB
25(OH)D3 SERPL-MCNC: 12.3 NG/ML
ALBUMIN SERPL ELPH-MCNC: 4.3 G/DL
ALP BLD-CCNC: 50 U/L
ALT SERPL-CCNC: 34 U/L
ANION GAP SERPL CALC-SCNC: 12 MMOL/L
APPEARANCE: CLEAR
AST SERPL-CCNC: 21 U/L
BACTERIA: NEGATIVE
BASOPHILS # BLD AUTO: 0.02 K/UL
BASOPHILS NFR BLD AUTO: 0.4 %
BILIRUB SERPL-MCNC: 0.5 MG/DL
BILIRUBIN URINE: NEGATIVE
BLOOD URINE: NEGATIVE
BUN SERPL-MCNC: 15 MG/DL
CALCIUM SERPL-MCNC: 9.5 MG/DL
CHLORIDE SERPL-SCNC: 104 MMOL/L
CHOLEST SERPL-MCNC: 203 MG/DL
CO2 SERPL-SCNC: 24 MMOL/L
COLOR: NORMAL
CREAT SERPL-MCNC: 1.44 MG/DL
EOSINOPHIL # BLD AUTO: 0.06 K/UL
EOSINOPHIL NFR BLD AUTO: 1.3 %
ESTIMATED AVERAGE GLUCOSE: 126 MG/DL
GLUCOSE QUALITATIVE U: NEGATIVE
GLUCOSE SERPL-MCNC: 119 MG/DL
HBA1C MFR BLD HPLC: 6 %
HCT VFR BLD CALC: 48.2 %
HDLC SERPL-MCNC: 32 MG/DL
HGB BLD-MCNC: 15.8 G/DL
HSV 1+2 IGG SER IA-IMP: NEGATIVE
HSV 1+2 IGG SER IA-IMP: POSITIVE
HSV1 IGG SER QL: 15.5 INDEX
HSV2 IGG SER QL: 0.14 INDEX
HYALINE CASTS: 1 /LPF
IMM GRANULOCYTES NFR BLD AUTO: 0 %
KETONES URINE: NEGATIVE
LDLC SERPL CALC-MCNC: 137 MG/DL
LEUKOCYTE ESTERASE URINE: NEGATIVE
LYMPHOCYTES # BLD AUTO: 2.81 K/UL
LYMPHOCYTES NFR BLD AUTO: 62.6 %
MAN DIFF?: NORMAL
MCHC RBC-ENTMCNC: 29.9 PG
MCHC RBC-ENTMCNC: 32.8 GM/DL
MCV RBC AUTO: 91.3 FL
MICROSCOPIC-UA: NORMAL
MONOCYTES # BLD AUTO: 0.5 K/UL
MONOCYTES NFR BLD AUTO: 11.1 %
NEUTROPHILS # BLD AUTO: 1.1 K/UL
NEUTROPHILS NFR BLD AUTO: 24.6 %
NITRITE URINE: NEGATIVE
NONHDLC SERPL-MCNC: 171 MG/DL
PH URINE: 6
PLATELET # BLD AUTO: 204 K/UL
POTASSIUM SERPL-SCNC: 4.3 MMOL/L
PROT SERPL-MCNC: 7 G/DL
PROTEIN URINE: NEGATIVE
PSA SERPL-MCNC: 0.44 NG/ML
RBC # BLD: 5.28 M/UL
RBC # FLD: 14.2 %
RED BLOOD CELLS URINE: 0 /HPF
SARS-COV-2 IGG SERPL IA-ACNC: 0.1 INDEX
SARS-COV-2 IGG SERPL QL IA: NEGATIVE
SARS-COV-2 N GENE NPH QL NAA+PROBE: DETECTED
SODIUM SERPL-SCNC: 140 MMOL/L
SPECIFIC GRAVITY URINE: 1.02
SQUAMOUS EPITHELIAL CELLS: 0 /HPF
T PALLIDUM AB SER QL IA: NEGATIVE
TRIGL SERPL-MCNC: 170 MG/DL
TSH SERPL-ACNC: 1.58 UIU/ML
UROBILINOGEN URINE: NORMAL
WBC # FLD AUTO: 4.49 K/UL
WHITE BLOOD CELLS URINE: 0 /HPF

## 2021-01-14 LAB
C TRACH RRNA SPEC QL NAA+PROBE: NOT DETECTED
HIV1+2 AB SPEC QL IA.RAPID: NONREACTIVE
N GONORRHOEA RRNA SPEC QL NAA+PROBE: NOT DETECTED
SOURCE AMPLIFICATION: NORMAL

## 2021-01-19 ENCOUNTER — APPOINTMENT (OUTPATIENT)
Dept: FAMILY MEDICINE | Facility: CLINIC | Age: 41
End: 2021-01-19

## 2021-02-16 ENCOUNTER — OUTPATIENT (OUTPATIENT)
Dept: OUTPATIENT SERVICES | Facility: HOSPITAL | Age: 41
LOS: 1 days | End: 2021-02-16
Payer: COMMERCIAL

## 2021-02-16 VITALS
RESPIRATION RATE: 18 BRPM | WEIGHT: 315 LBS | TEMPERATURE: 97 F | HEART RATE: 80 BPM | SYSTOLIC BLOOD PRESSURE: 136 MMHG | DIASTOLIC BLOOD PRESSURE: 86 MMHG | OXYGEN SATURATION: 97 % | HEIGHT: 74 IN

## 2021-02-16 DIAGNOSIS — J34.89 OTHER SPECIFIED DISORDERS OF NOSE AND NASAL SINUSES: Chronic | ICD-10-CM

## 2021-02-16 DIAGNOSIS — J32.0 CHRONIC MAXILLARY SINUSITIS: ICD-10-CM

## 2021-02-16 DIAGNOSIS — M27.40 UNSPECIFIED CYST OF JAW: Chronic | ICD-10-CM

## 2021-02-16 DIAGNOSIS — Z01.818 ENCOUNTER FOR OTHER PREPROCEDURAL EXAMINATION: ICD-10-CM

## 2021-02-16 DIAGNOSIS — S86.012A STRAIN OF LEFT ACHILLES TENDON, INITIAL ENCOUNTER: Chronic | ICD-10-CM

## 2021-02-16 DIAGNOSIS — D16.5 BENIGN NEOPLASM OF LOWER JAW BONE: ICD-10-CM

## 2021-02-16 DIAGNOSIS — Z98.890 OTHER SPECIFIED POSTPROCEDURAL STATES: Chronic | ICD-10-CM

## 2021-02-16 DIAGNOSIS — L72.9 FOLLICULAR CYST OF THE SKIN AND SUBCUTANEOUS TISSUE, UNSPECIFIED: Chronic | ICD-10-CM

## 2021-02-16 DIAGNOSIS — G47.33 OBSTRUCTIVE SLEEP APNEA (ADULT) (PEDIATRIC): ICD-10-CM

## 2021-02-16 LAB
ANION GAP SERPL CALC-SCNC: 12 MMOL/L — SIGNIFICANT CHANGE UP (ref 5–17)
BUN SERPL-MCNC: 15 MG/DL — SIGNIFICANT CHANGE UP (ref 7–23)
CALCIUM SERPL-MCNC: 10.6 MG/DL — HIGH (ref 8.4–10.5)
CHLORIDE SERPL-SCNC: 104 MMOL/L — SIGNIFICANT CHANGE UP (ref 96–108)
CO2 SERPL-SCNC: 24 MMOL/L — SIGNIFICANT CHANGE UP (ref 22–31)
CREAT SERPL-MCNC: 1.24 MG/DL — SIGNIFICANT CHANGE UP (ref 0.5–1.3)
GLUCOSE SERPL-MCNC: 103 MG/DL — HIGH (ref 70–99)
HCT VFR BLD CALC: 47.8 % — SIGNIFICANT CHANGE UP (ref 39–50)
HGB BLD-MCNC: 15.6 G/DL — SIGNIFICANT CHANGE UP (ref 13–17)
MCHC RBC-ENTMCNC: 28.8 PG — SIGNIFICANT CHANGE UP (ref 27–34)
MCHC RBC-ENTMCNC: 32.6 GM/DL — SIGNIFICANT CHANGE UP (ref 32–36)
MCV RBC AUTO: 88.2 FL — SIGNIFICANT CHANGE UP (ref 80–100)
NRBC # BLD: 0 /100 WBCS — SIGNIFICANT CHANGE UP (ref 0–0)
PLATELET # BLD AUTO: 235 K/UL — SIGNIFICANT CHANGE UP (ref 150–400)
POTASSIUM SERPL-MCNC: 4.3 MMOL/L — SIGNIFICANT CHANGE UP (ref 3.5–5.3)
POTASSIUM SERPL-SCNC: 4.3 MMOL/L — SIGNIFICANT CHANGE UP (ref 3.5–5.3)
RBC # BLD: 5.42 M/UL — SIGNIFICANT CHANGE UP (ref 4.2–5.8)
RBC # FLD: 14 % — SIGNIFICANT CHANGE UP (ref 10.3–14.5)
SODIUM SERPL-SCNC: 140 MMOL/L — SIGNIFICANT CHANGE UP (ref 135–145)
WBC # BLD: 4.17 K/UL — SIGNIFICANT CHANGE UP (ref 3.8–10.5)
WBC # FLD AUTO: 4.17 K/UL — SIGNIFICANT CHANGE UP (ref 3.8–10.5)

## 2021-02-16 PROCEDURE — 80048 BASIC METABOLIC PNL TOTAL CA: CPT

## 2021-02-16 PROCEDURE — 85027 COMPLETE CBC AUTOMATED: CPT

## 2021-02-16 PROCEDURE — G0463: CPT

## 2021-02-16 RX ORDER — SODIUM CHLORIDE 9 MG/ML
3 INJECTION INTRAMUSCULAR; INTRAVENOUS; SUBCUTANEOUS EVERY 8 HOURS
Refills: 0 | Status: DISCONTINUED | OUTPATIENT
Start: 2021-03-01 | End: 2021-03-15

## 2021-02-16 RX ORDER — LIDOCAINE HCL 20 MG/ML
0.2 VIAL (ML) INJECTION ONCE
Refills: 0 | Status: DISCONTINUED | OUTPATIENT
Start: 2021-03-01 | End: 2021-03-15

## 2021-02-16 NOTE — H&P PST ADULT - NSICDXPASTSURGICALHX_GEN_ALL_CORE_FT
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

## 2021-02-16 NOTE — H&P PST ADULT - NS PRO AD NO ADVANCE DIRECTIVE
No PMH. Dx with flu today and on day one of tamiflu experiencing vomiting x3. Decrease PO and UO. Pt drinking similac 125cc with three UO today.
No

## 2021-02-16 NOTE — H&P PST ADULT - HISTORY OF PRESENT ILLNESS
40 year old male withchronic maxillary sinusitis.  Denies recent infection.   40 year old male with history of benign neoplasm of lower jaw bone, s/p  left maxillectomy, left infrastructure maxillary & placement, obturator with wires/screws 2017. He endorses chronic sinusitis.  He denies fever, chills, recent sinus infection,  recent  travel or sick contacts recent infection. He presents to [facility] for screening prior to Endoscopic sinus surgery on 3/1/2021.    pre-op covid screening scheduled for 2/26 at [facility]

## 2021-02-16 NOTE — H&P PST ADULT - NSICDXPROBLEM_GEN_ALL_CORE_FT
PROBLEM DIAGNOSES  Problem: INGE (obstructive sleep apnea)  Assessment and Plan: INGE precautions   OR booking notified    Problem: Benign neoplasm of lower jaw bone  Assessment and Plan:        PROBLEM DIAGNOSES  Problem: Benign neoplasm of lower jaw bone  Assessment and Plan: Functional Endoscopic Sinus Surgery    Problem: INGE (obstructive sleep apnea)  Assessment and Plan: INGE precautions   OR booking notified

## 2021-02-16 NOTE — H&P PST ADULT - PRIMARY CARE PROVIDER
[phone][phone]

## 2021-02-16 NOTE — H&P PST ADULT - VISION (WITH CORRECTIVE LENSES IF THE PATIENT USUALLY WEARS THEM):
Infant born via c/s 2/2 category II tracing. Adm to NICU 2/2 prematurity and r/o sepsis. Infant on RA. Down 20g x24 hrs; down 1% from BW DOL 1 wnl. Chem 93. IV: D10 EHA @ 6.5ml/hr cont x24 hrs. Intake: 80ml/kg, 38kcal/kg, 2.8g pro/kg. GIR 5.5mg/kg/min. Est Needs: 150ml/kg, 90-110kcal/kg, 3.5-4g pro/kg. Meetin-35% kcal needs, 80-70% pro needs.
Normal vision: sees adequately in most situations; can see medication labels, newsprint

## 2021-02-16 NOTE — H&P PST ADULT - NSICDXPASTMEDICALHX_GEN_ALL_CORE_FT
PAST MEDICAL HISTORY:  Ameloblastoma     Asymptomatic COVID-19 virus infection     Chronic maxillary sinusitis     GERD (gastroesophageal reflux disease)     Morbid obesity      PAST MEDICAL HISTORY:  Ameloblastoma Excised    Asymptomatic COVID-19 virus infection     Chronic maxillary sinusitis     GERD (gastroesophageal reflux disease)     Morbid obesity

## 2021-02-22 PROBLEM — U07.1 COVID-19: Chronic | Status: ACTIVE | Noted: 2021-02-16

## 2021-02-22 PROBLEM — D16.5 BENIGN NEOPLASM OF LOWER JAW BONE: Chronic | Status: ACTIVE | Noted: 2017-10-03

## 2021-02-26 ENCOUNTER — OUTPATIENT (OUTPATIENT)
Dept: OUTPATIENT SERVICES | Facility: HOSPITAL | Age: 41
LOS: 1 days | End: 2021-02-26
Payer: COMMERCIAL

## 2021-02-26 DIAGNOSIS — J34.89 OTHER SPECIFIED DISORDERS OF NOSE AND NASAL SINUSES: Chronic | ICD-10-CM

## 2021-02-26 DIAGNOSIS — S86.012A STRAIN OF LEFT ACHILLES TENDON, INITIAL ENCOUNTER: Chronic | ICD-10-CM

## 2021-02-26 DIAGNOSIS — L72.9 FOLLICULAR CYST OF THE SKIN AND SUBCUTANEOUS TISSUE, UNSPECIFIED: Chronic | ICD-10-CM

## 2021-02-26 DIAGNOSIS — Z98.890 OTHER SPECIFIED POSTPROCEDURAL STATES: Chronic | ICD-10-CM

## 2021-02-26 DIAGNOSIS — Z11.52 ENCOUNTER FOR SCREENING FOR COVID-19: ICD-10-CM

## 2021-02-26 DIAGNOSIS — M27.40 UNSPECIFIED CYST OF JAW: Chronic | ICD-10-CM

## 2021-02-26 LAB — SARS-COV-2 RNA SPEC QL NAA+PROBE: SIGNIFICANT CHANGE UP

## 2021-02-26 PROCEDURE — U0005: CPT

## 2021-02-26 PROCEDURE — U0003: CPT

## 2021-02-26 PROCEDURE — C9803: CPT

## 2021-02-28 ENCOUNTER — TRANSCRIPTION ENCOUNTER (OUTPATIENT)
Age: 41
End: 2021-02-28

## 2021-03-01 ENCOUNTER — APPOINTMENT (OUTPATIENT)
Dept: OTOLARYNGOLOGY | Facility: HOSPITAL | Age: 41
End: 2021-03-01

## 2021-03-01 ENCOUNTER — OUTPATIENT (OUTPATIENT)
Dept: OUTPATIENT SERVICES | Facility: HOSPITAL | Age: 41
LOS: 1 days | End: 2021-03-01
Payer: COMMERCIAL

## 2021-03-01 ENCOUNTER — RESULT REVIEW (OUTPATIENT)
Age: 41
End: 2021-03-01

## 2021-03-01 VITALS
OXYGEN SATURATION: 97 % | TEMPERATURE: 98 F | HEART RATE: 75 BPM | RESPIRATION RATE: 18 BRPM | SYSTOLIC BLOOD PRESSURE: 100 MMHG | DIASTOLIC BLOOD PRESSURE: 52 MMHG

## 2021-03-01 VITALS
RESPIRATION RATE: 18 BRPM | WEIGHT: 315 LBS | TEMPERATURE: 97 F | HEIGHT: 74 IN | SYSTOLIC BLOOD PRESSURE: 116 MMHG | DIASTOLIC BLOOD PRESSURE: 66 MMHG | OXYGEN SATURATION: 97 % | HEART RATE: 77 BPM

## 2021-03-01 DIAGNOSIS — D16.5 BENIGN NEOPLASM OF LOWER JAW BONE: ICD-10-CM

## 2021-03-01 DIAGNOSIS — Z98.890 OTHER SPECIFIED POSTPROCEDURAL STATES: Chronic | ICD-10-CM

## 2021-03-01 DIAGNOSIS — J32.0 CHRONIC MAXILLARY SINUSITIS: ICD-10-CM

## 2021-03-01 DIAGNOSIS — J34.89 OTHER SPECIFIED DISORDERS OF NOSE AND NASAL SINUSES: Chronic | ICD-10-CM

## 2021-03-01 DIAGNOSIS — L72.9 FOLLICULAR CYST OF THE SKIN AND SUBCUTANEOUS TISSUE, UNSPECIFIED: Chronic | ICD-10-CM

## 2021-03-01 DIAGNOSIS — S86.012A STRAIN OF LEFT ACHILLES TENDON, INITIAL ENCOUNTER: Chronic | ICD-10-CM

## 2021-03-01 DIAGNOSIS — M27.40 UNSPECIFIED CYST OF JAW: Chronic | ICD-10-CM

## 2021-03-01 PROCEDURE — 31276 NSL/SINS NDSC FRNT TISS RMVL: CPT | Mod: LT

## 2021-03-01 PROCEDURE — 31267 ENDOSCOPY MAXILLARY SINUS: CPT | Mod: LT

## 2021-03-01 PROCEDURE — 88311 DECALCIFY TISSUE: CPT

## 2021-03-01 PROCEDURE — 31255 NSL/SINS NDSC W/TOT ETHMDCT: CPT | Mod: 50,59

## 2021-03-01 PROCEDURE — 31256 EXPLORATION MAXILLARY SINUS: CPT | Mod: RT

## 2021-03-01 PROCEDURE — 88311 DECALCIFY TISSUE: CPT | Mod: 26

## 2021-03-01 PROCEDURE — C9399: CPT

## 2021-03-01 PROCEDURE — 61782 SCAN PROC CRANIAL EXTRA: CPT

## 2021-03-01 PROCEDURE — C2625: CPT

## 2021-03-01 PROCEDURE — C1889: CPT

## 2021-03-01 PROCEDURE — 88304 TISSUE EXAM BY PATHOLOGIST: CPT

## 2021-03-01 PROCEDURE — 31255 NSL/SINS NDSC W/TOT ETHMDCT: CPT | Mod: RT

## 2021-03-01 PROCEDURE — 31253 NSL/SINS NDSC TOTAL: CPT | Mod: LT

## 2021-03-01 PROCEDURE — 88304 TISSUE EXAM BY PATHOLOGIST: CPT | Mod: 26

## 2021-03-01 RX ORDER — SCOPALAMINE 1 MG/3D
1 PATCH, EXTENDED RELEASE TRANSDERMAL ONCE
Refills: 0 | Status: COMPLETED | OUTPATIENT
Start: 2021-03-01 | End: 2021-03-01

## 2021-03-01 RX ORDER — FENTANYL CITRATE 50 UG/ML
25 INJECTION INTRAVENOUS
Refills: 0 | Status: DISCONTINUED | OUTPATIENT
Start: 2021-03-01 | End: 2021-03-01

## 2021-03-01 RX ORDER — OXYCODONE HYDROCHLORIDE 5 MG/1
5 TABLET ORAL ONCE
Refills: 0 | Status: DISCONTINUED | OUTPATIENT
Start: 2021-03-01 | End: 2021-03-01

## 2021-03-01 RX ORDER — OXYCODONE HYDROCHLORIDE 5 MG/1
10 TABLET ORAL ONCE
Refills: 0 | Status: DISCONTINUED | OUTPATIENT
Start: 2021-03-01 | End: 2021-03-01

## 2021-03-01 RX ORDER — ONDANSETRON 8 MG/1
4 TABLET, FILM COATED ORAL ONCE
Refills: 0 | Status: DISCONTINUED | OUTPATIENT
Start: 2021-03-01 | End: 2021-03-15

## 2021-03-01 RX ORDER — IBUPROFEN 200 MG
1 TABLET ORAL
Qty: 0 | Refills: 0 | DISCHARGE

## 2021-03-01 RX ORDER — APREPITANT 80 MG/1
40 CAPSULE ORAL ONCE
Refills: 0 | Status: COMPLETED | OUTPATIENT
Start: 2021-03-01 | End: 2021-03-01

## 2021-03-01 RX ADMIN — SCOPALAMINE 1 PATCH: 1 PATCH, EXTENDED RELEASE TRANSDERMAL at 06:51

## 2021-03-01 RX ADMIN — APREPITANT 40 MILLIGRAM(S): 80 CAPSULE ORAL at 06:51

## 2021-03-01 NOTE — ASU PATIENT PROFILE, ADULT - PSH
Achilles rupture, left  2010 repair  Cyst of skin  back 4/5/2017  H/O endoscopic sinus surgery  9/2020  Jaw cyst  s/p enucleation cyst left maxilla buccal fat flap 4/2017  Maxillary sinus mass  Ameloblastoma --excision 2017

## 2021-03-01 NOTE — ASU DISCHARGE PLAN (ADULT/PEDIATRIC) - CARE PROVIDER_API CALL
Alex Eduardo (MD)  Otolaryngology  [address] [facility], [address] [place] [address]  Phone: [phone]  Fax: [phone]  Follow Up Time:

## 2021-03-01 NOTE — ASU DISCHARGE PLAN (ADULT/PEDIATRIC) - NURSING INSTRUCTIONS
You were given Tylenol during your visit, the next dose of Tylenol will be on or after _____2:00 PM______ ,today/tonight and every 6 hours afterwards for pain management, do not take any Tylenol containing products until this time. Do not exceed more than 4000mg of Tylenol in one 24 hour setting. You may take off the Scopalamine patch on or before 3/4/21 @7am.

## 2021-03-01 NOTE — ASU PATIENT PROFILE, ADULT - PMH
Ameloblastoma  Excised  Asymptomatic COVID-19 virus infection    Chronic maxillary sinusitis    GERD (gastroesophageal reflux disease)    Morbid obesity

## 2021-03-03 NOTE — ASU PREOP CHECKLIST - AICD PRESENT
-- DO NOT REPLY / DO NOT REPLY ALL --  -- Message is from the [facility]--    COVID-19 Universal Screening: N/A - Not about scheduling    General Patient Message      Reason for Call: Veronica from [employer] called in to follow up with a prior authorization for the patient with Dt Baljeet Robles and request a call back to assist     Caller Information       Type Contact Phone    03/03/2021 12:59 PM CST Phone (Incoming) Veronica  (Other) [phone]     [employer]          Alternative phone number: None    Turnaround time given to caller:   \"This message will be sent to [state Provider's name]. The clinical team will fulfill your request as soon as they review your message.\"     no

## 2021-03-04 ENCOUNTER — APPOINTMENT (OUTPATIENT)
Dept: OTOLARYNGOLOGY | Facility: CLINIC | Age: 41
End: 2021-03-04
Payer: COMMERCIAL

## 2021-03-04 VITALS
SYSTOLIC BLOOD PRESSURE: 128 MMHG | WEIGHT: 300 LBS | DIASTOLIC BLOOD PRESSURE: 82 MMHG | BODY MASS INDEX: 38.5 KG/M2 | HEART RATE: 66 BPM | HEIGHT: 74 IN

## 2021-03-04 LAB — SURGICAL PATHOLOGY STUDY: SIGNIFICANT CHANGE UP

## 2021-03-04 PROCEDURE — 31237 NSL/SINS NDSC SURG BX POLYPC: CPT | Mod: 50

## 2021-03-04 PROCEDURE — 99072 ADDL SUPL MATRL&STAF TM PHE: CPT

## 2021-03-04 PROCEDURE — 99213 OFFICE O/P EST LOW 20 MIN: CPT | Mod: 25

## 2021-03-04 NOTE — REASON FOR VISIT
[Subsequent Evaluation] : a subsequent evaluation for [FreeTextEntry2] : follow up s/p sinus surgery 3/1/21

## 2021-03-04 NOTE — HISTORY OF PRESENT ILLNESS
[de-identified] : 40M s/p ESS 3/1 presents for follow-up\par \par OPERATIONS:\par 1. Left endoscopic medial maxillectomy\par 2. Right endoscopic maxillary antrostomy\par 3. Bilateral endoscopic total ethmoidectomy\par 4. Left endoscopic frontal sinusotomy\par 5. Use of stereotactic image navigation, extradural\par \par Path not back yet\par \par Feeling extremely congested, difficult to sleep\par Using nasal saline as recommended\par Has not used any pain medicine

## 2021-03-10 ENCOUNTER — APPOINTMENT (OUTPATIENT)
Dept: OTOLARYNGOLOGY | Facility: CLINIC | Age: 41
End: 2021-03-10
Payer: COMMERCIAL

## 2021-03-10 VITALS
DIASTOLIC BLOOD PRESSURE: 77 MMHG | SYSTOLIC BLOOD PRESSURE: 109 MMHG | WEIGHT: 300 LBS | HEIGHT: 74 IN | BODY MASS INDEX: 38.5 KG/M2 | HEART RATE: 85 BPM

## 2021-03-10 PROCEDURE — 99072 ADDL SUPL MATRL&STAF TM PHE: CPT

## 2021-03-10 PROCEDURE — 99214 OFFICE O/P EST MOD 30 MIN: CPT | Mod: 25

## 2021-03-10 PROCEDURE — 31237 NSL/SINS NDSC SURG BX POLYPC: CPT | Mod: 50

## 2021-03-10 NOTE — REASON FOR VISIT
[Subsequent Evaluation] : a subsequent evaluation for [FreeTextEntry2] : follow up s/p sinus surgery 3/1/21.

## 2021-03-10 NOTE — HISTORY OF PRESENT ILLNESS
[de-identified] : 40M s/p ESS 3/1 presents for follow-up\par \par OPERATIONS:\par 1. Left endoscopic medial maxillectomy\par 2. Right endoscopic maxillary antrostomy\par 3. Bilateral endoscopic total ethmoidectomy\par 4. Left endoscopic frontal sinusotomy\par 5. Use of stereotactic image navigation, extradural\par \par Reports congestion improved\par Using nasal saline as recommended\par Taking Naproxen 500mg for tendinitis on left leg-- recently saw ortho and starting PT\par

## 2021-03-25 ENCOUNTER — APPOINTMENT (OUTPATIENT)
Dept: OTOLARYNGOLOGY | Facility: CLINIC | Age: 41
End: 2021-03-25
Payer: COMMERCIAL

## 2021-03-25 PROCEDURE — 99213 OFFICE O/P EST LOW 20 MIN: CPT | Mod: 25

## 2021-03-25 PROCEDURE — 31237 NSL/SINS NDSC SURG BX POLYPC: CPT | Mod: 50

## 2021-03-25 PROCEDURE — 99072 ADDL SUPL MATRL&STAF TM PHE: CPT

## 2021-03-25 NOTE — HISTORY OF PRESENT ILLNESS
[de-identified] : 40 year old male follow up s/p ESS 03/01/21\par LCV 03/10/21 for debridement\par Symptoms improving-- less pain/pressure and drainage\par Using nasal saline as recommended\par \par OPERATIONS:\par 1. Left endoscopic medial maxillectomy\par 2. Right endoscopic maxillary antrostomy\par 3. Bilateral endoscopic total ethmoidectomy\par 4. Left endoscopic frontal sinusotomy\par 5. Use of stereotactic image navigation, extradural\par

## 2021-05-13 ENCOUNTER — APPOINTMENT (OUTPATIENT)
Dept: OTOLARYNGOLOGY | Facility: CLINIC | Age: 41
End: 2021-05-13
Payer: COMMERCIAL

## 2021-05-13 VITALS
HEART RATE: 85 BPM | HEIGHT: 74 IN | DIASTOLIC BLOOD PRESSURE: 96 MMHG | WEIGHT: 295 LBS | SYSTOLIC BLOOD PRESSURE: 149 MMHG | BODY MASS INDEX: 37.86 KG/M2

## 2021-05-13 PROCEDURE — 99213 OFFICE O/P EST LOW 20 MIN: CPT | Mod: 25

## 2021-05-13 PROCEDURE — 99072 ADDL SUPL MATRL&STAF TM PHE: CPT

## 2021-05-13 PROCEDURE — 31237 NSL/SINS NDSC SURG BX POLYPC: CPT

## 2021-05-13 NOTE — HISTORY OF PRESENT ILLNESS
[de-identified] : 40 year old male follow up s/p ESS 03/01/21\par LCV 03/25/21\par Symptoms improving-- less pain/pressure and drainage\par States has clear nasal discharge, but thinks could be due to allergies, taking Allegra and using Flonase.\par States 2 episodes of epistaxis in the last 2 weeks at night, used digital pressure and resolved.\par Using nasal saline as recommended-- ran out of steroid rinse\par \par OPERATIONS:\par 1. Left endoscopic medial maxillectomy\par 2. Right endoscopic maxillary antrostomy\par 3. Bilateral endoscopic total ethmoidectomy\par 4. Left endoscopic frontal sinusotomy\par 5. Use of stereotactic image navigation, extradural

## 2021-05-13 NOTE — REASON FOR VISIT
[Subsequent Evaluation] : a subsequent evaluation for [FreeTextEntry2] : follow up s/p ESS 03/01/21. \par

## 2021-07-06 ENCOUNTER — APPOINTMENT (OUTPATIENT)
Dept: OTOLARYNGOLOGY | Facility: CLINIC | Age: 41
End: 2021-07-06

## 2021-08-12 ENCOUNTER — APPOINTMENT (OUTPATIENT)
Dept: OTOLARYNGOLOGY | Facility: CLINIC | Age: 41
End: 2021-08-12
Payer: COMMERCIAL

## 2021-08-12 VITALS
BODY MASS INDEX: 37.22 KG/M2 | DIASTOLIC BLOOD PRESSURE: 80 MMHG | HEIGHT: 74 IN | HEART RATE: 82 BPM | WEIGHT: 290 LBS | SYSTOLIC BLOOD PRESSURE: 116 MMHG

## 2021-08-12 PROCEDURE — 99213 OFFICE O/P EST LOW 20 MIN: CPT | Mod: 25

## 2021-08-12 PROCEDURE — 31231 NASAL ENDOSCOPY DX: CPT

## 2021-08-12 RX ORDER — OXYCODONE AND ACETAMINOPHEN 5; 325 MG/1; MG/1
5-325 TABLET ORAL
Qty: 15 | Refills: 0 | Status: DISCONTINUED | COMMUNITY
Start: 2021-03-01 | End: 2021-08-12

## 2021-08-12 RX ORDER — BUDESONIDE, MICRONIZED 100 %
POWDER (GRAM) MISCELLANEOUS
Qty: 120 | Refills: 3 | Status: DISCONTINUED | COMMUNITY
Start: 2021-05-13 | End: 2021-08-12

## 2021-08-12 RX ORDER — IBUPROFEN 800 MG/1
TABLET, FILM COATED ORAL
Refills: 0 | Status: DISCONTINUED | COMMUNITY
End: 2021-08-12

## 2021-08-12 RX ORDER — FLUTICASONE PROPIONATE 50 UG/1
50 SPRAY, METERED NASAL TWICE DAILY
Qty: 2 | Refills: 5 | Status: DISCONTINUED | COMMUNITY
Start: 2020-10-19 | End: 2021-08-12

## 2021-08-12 RX ORDER — IPRATROPIUM BROMIDE 42 UG/1
0.06 SPRAY NASAL
Qty: 1 | Refills: 3 | Status: DISCONTINUED | COMMUNITY
Start: 2021-05-13 | End: 2021-08-12

## 2021-08-12 RX ORDER — BUDESONIDE, MICRONIZED 100 %
POWDER (GRAM) MISCELLANEOUS
Qty: 120 | Refills: 3 | Status: DISCONTINUED | COMMUNITY
Start: 2021-01-06 | End: 2021-08-12

## 2021-08-12 RX ORDER — NAPROXEN 500 MG/1
500 TABLET ORAL
Refills: 0 | Status: DISCONTINUED | COMMUNITY
End: 2021-08-12

## 2021-08-12 RX ORDER — LEVOCETIRIZINE DIHYDROCHLORIDE 5 MG/1
5 TABLET ORAL
Qty: 30 | Refills: 3 | Status: DISCONTINUED | COMMUNITY
Start: 2021-05-13 | End: 2021-08-12

## 2021-08-12 RX ORDER — BUDESONIDE, MICRONIZED 100 %
POWDER (GRAM) MISCELLANEOUS
Qty: 120 | Refills: 3 | Status: DISCONTINUED | COMMUNITY
Start: 2021-03-25 | End: 2021-08-12

## 2021-08-12 NOTE — HISTORY OF PRESENT ILLNESS
[de-identified] : 40 year old male follow up s/p ESS 03/01/21. \par LCV 05/13/2021\par Feels well from sinonasal standpoint\par Not using sprays\par Intermittent anterior rhinorrhea and PND, worse with food\par Denies current nasal congestion, facial pain/pressure, recent sinus infections\par Sense of smell is good \par \par OPERATIONS:\par 1. Left endoscopic medial maxillectomy\par 2. Right endoscopic maxillary antrostomy\par 3. Bilateral endoscopic total ethmoidectomy\par 4. Left endoscopic frontal sinusotomy\par 5. Use of stereotactic image navigation, extradural

## 2021-12-30 NOTE — H&P PST ADULT - NSALCOHOLFREQ_GEN_A_CORE_SD
Prep note by anshu Weller  No evidence for acute deep venous thrombosis of either lower   extremity.      Electronically Signed by: MABEL ARAYA M.D.    Signed on: 12/26/2021 11:42 AM          US KIDNEY BILATERAL   Final Result   1.  Limited study although otherwise normal bilateral renal ultrasound.         Electronically Signed by: MABEL ARAYA M.D.    Signed on: 12/26/2021 12:14 PM          XR CHEST PA OR AP 1 VIEW   Final Result   1.  Diffuse bilateral pulmonary infiltrates right greater than left.   2.  Mild cardiomegaly.      Electronically Signed by: DEBBIE PANIAGUA M.D.    Signed on: 12/24/2021 11:06 AM                Diagnosis   Pneumonia due to COVID-19 -Improving.  Acute respiratory failure with hypoxemia-she is on high Concentration of inspired oxygen.     Allergy to dye that is contained in vitamin C and zinc     Morbid obesity  Sleep apnea     Procalcitonin 0.12-->0.05  C-reactive protein 11.4  D-dimer 0.72-->1.34  Interleukin-6 level 25.0  QuantiFERON-TB gold indeterminte  Hepatitis panel negative  Plan     Inflammatory markers will be monitored  Remdsivir as ordered  Dexamethasone twice daily  Baricitinib discontinued, patient received Actemra .  Acyclovir  Guaifenesin  Lasix.      Keep O2 saturation 92 to 95%.  Wean oxygen as tolerated  BiPAP during sleep and as needed to decrease work of breathing     DVT Prophylaxis 40mg  Discussed with RN  Inpatient Medications     Current Facility-Administered Medications   Medication   • HYDROcodone-acetaminophen (NORCO)  MG per tablet 1 tablet   • furosemide (LASIX) tablet 20 mg   • benzonatate (TESSALON PERLES) capsule 100 mg   • sodium chloride 0.9 % flush bag 25 mL   • sodium chloride (PF) 0.9 % injection 2 mL   • acyclovir (ZOVIRAX) capsule 400 mg   • pantoprazole (PROTONIX) EC tablet 40 mg   • albuterol inhaler 2 puff   • [Held by provider] bumetanide (BUMEX) tablet 2 mg   • levothyroxine (SYNTHROID, LEVOTHROID) tablet 112 mcg   • tiZANidine (ZANAFLEX) tablet 2 mg   •  anshu Weller for Dr. Farhan Hennessy.     2-3 times/wk

## 2022-01-23 NOTE — H&P ADULT. - NS HIV RISK FACTOR
Patient alert and oriented x4. Complaint of pain to surgical site, prn medication administered. Denies any SOB. Vital signs stable. Patient sitting up in chair during shift. Dressing to right knee C/D/I. Safety precautions in place, call light within reach.    No

## 2022-02-11 NOTE — H&P PST ADULT - DOES PATIENT MEET CRITERIA FOR SEPSIS
Home care PT checked O2 sats today, were 89%-94%, had to call primary MD and was told to come to ED  94% in triage on room air, hx of COPD    Had tracheostomy takedown, wound still hasn't fully healed yet  Denies any SOB, chest pain, cough, fevers       No

## 2022-04-11 NOTE — ASU PATIENT PROFILE, ADULT - NS SC CAGE ALCOHOL EYE OPENER
History:  Levy is a 11 year old male who presents to the office with hives that have been present over the past 6 days.  The family is concerned because for many years, whenever he gets ill with respiratory infection or a gastrointestinal infection, he develops urticaria.  He generally takes Benadryl and eventually the urticaria resolve.  The urticaria will sometimes occur randomly without an apparent triggering event.  It does not seem to be caused by a particular food.  It does not seem to be caused by sun exposure.  The family is concerned because this has become a persistent problem and they are looking for answers as to why and to see if there is a way to prevent the symptoms.  He complains of itching, but otherwise it does not seem to interfere with his activities.  He remains active appetite remains consistent.  Growth chart was reviewed and there is no slowing of growth.    Past Medical History:  Problem list was reviewed.      Current Outpatient Medications   Medication   • albuterol (PROAIR RESPICLICK) 108 (90 Base) MCG/ACT inhaler   • budesonide-formoterol (Symbicort) 80-4.5 MCG/ACT inhaler   • clobetasol (TEMOVATE) 0.05 % ointment   • hydroCORTisone (CORTIZONE) 2.5 % cream   • fluticasone (CUTIVATE) 0.05 % cream   • fluticasone (FLONASE) 50 MCG/ACT nasal spray   • cetirizine (ZYRTEC) 10 MG tablet   • erythromycin (ILOTYCIN) ophthalmic ointment   • sertraline (ZOLOFT) 20 MG/ML concentrated oral solution   • Lactobacillus-Inulin (Culturelle Digestive Health) capsule   • triamcinolone (ARISTOCORT) 0.1 % cream   • hydrOXYzine (ATARAX) 10 MG/5ML syrup     No current facility-administered medications for this visit.        Allergies:  He has an allergy to penicillin and mood changes with montelukast.    Physical exam:  Vitals:    04/11/22 1543   BP: 98/62   Pulse: 100   Resp: 24   Temp: 96.8 °F (36 °C)     General appearance:  Patient is alert and well nourished in appearance.    Eyes: No scleral injection.   No drainage.  Ears: Tympanic membranes pale and shiny.  Nose: Nasal mucosa is moist and pink.  Throat: No erythema or exudate of posterior pharynx.  Neck: Supple. No adenopathy. No supraclavicular adenopathy.  Heart: Regular rate and rhythm.  Lungs: Clear to auscultation bilaterally.  Skin:  The patient has urticarial lesions on his legs today.      Assessment:  Hives    Plan:  The family would like to have the patient seen at [facility] due to the availability of the allergy clinic provider here in the clinic.  They were given a referral and contact information.  They have Zyrtec at home and it was suggested that he take 10 mg of Zyrtec daily as needed for the urticarial lesions.  He is to follow up as needed.     no

## 2022-05-09 NOTE — PROCEDURE
No response from patient letter was mailed, placed back in call back list.     [Rigid Endoscope] : examined with a rigid endoscope [Post-Op Patency] : post-op patency [None] : none [Serial Number: ___] : Serial Number: [unfilled] [FreeTextEntry6] : No lesions in the NC. There is no more crusting along the site of the recent antrostomy and the antrostomy appears patent but with some mucosal edema around it.  No lesions in the NPx.\par

## 2022-06-27 NOTE — ASU PATIENT PROFILE, ADULT - TEACHING/LEARNING RELIGIOUS CONSIDERATIONS
Dr. Bon Ledezma (Resident) and I performed a history and physical exam of the patient and we discussed the management plan.  I reviewed the Resident’s note and agree with the documented findings and plan of care.      Isidro Mahoney MD     none

## 2022-06-28 NOTE — H&P PST ADULT - NSALCOHOLPROBLEMSRELYN_GEN_A_CORE_SD
Additional Notes: Recommended to see a podiatrist. Patient will probably schedule for punch removal/ED+C Render Risk Assessment In Note?: no Detail Level: Zone no

## 2022-09-28 NOTE — ASU PATIENT PROFILE, ADULT - NSALCOHOLTYPE_GEN__A_CORE_SD
He is requesting a refill on Nexium 40 mg 1 p.o. daily.  I last saw him in 1 of 2022 when I did a colonoscopy on him.  I do not see that he is scheduled to follow-up with me in the office.  I also see that he has not had recent lab work to make sure that there are no complications associated with him being on Nexium chronically.  Tell him that I am happy to refill his Nexium but that I would want him to come see me in the office within the next 3 to 4 months to make sure he is doing well at and to check lab work.  I would want the following labs drawn on him:  - CBC, CMP, Mg.  Please give him enough Nexium 40 mg to get him back to see me or Ms. Da Silva in the office in follow-up.   beer/wine/liquor

## 2022-12-03 NOTE — ASU PATIENT PROFILE, ADULT - PMH
decreased weight-shifting ability
Ameloblastoma    Chronic maxillary sinusitis    GERD (gastroesophageal reflux disease)    Morbid obesity

## 2023-02-09 NOTE — H&P PST ADULT - VASCULAR
Calling patient on 02/09/23 to request he bring any information regarding surgery to preop appointment tomorrow.    Voice message was of a female speaking English.  Left message to call [phone] if we have reached this number to speak to patient in error.       detailed exam

## 2023-03-27 ENCOUNTER — APPOINTMENT (OUTPATIENT)
Dept: FAMILY MEDICINE | Facility: CLINIC | Age: 43
End: 2023-03-27

## 2023-04-18 ENCOUNTER — APPOINTMENT (OUTPATIENT)
Dept: OTOLARYNGOLOGY | Facility: CLINIC | Age: 43
End: 2023-04-18
Payer: COMMERCIAL

## 2023-04-18 VITALS
BODY MASS INDEX: 38.5 KG/M2 | DIASTOLIC BLOOD PRESSURE: 90 MMHG | SYSTOLIC BLOOD PRESSURE: 131 MMHG | WEIGHT: 300 LBS | HEART RATE: 69 BPM | HEIGHT: 74 IN

## 2023-04-18 DIAGNOSIS — D16.5 BENIGN NEOPLASM OF LOWER JAW BONE: ICD-10-CM

## 2023-04-18 DIAGNOSIS — J32.0 CHRONIC MAXILLARY SINUSITIS: ICD-10-CM

## 2023-04-18 PROCEDURE — 99214 OFFICE O/P EST MOD 30 MIN: CPT | Mod: 25

## 2023-04-18 PROCEDURE — 31231 NASAL ENDOSCOPY DX: CPT

## 2023-04-18 RX ORDER — IPRATROPIUM BROMIDE 42 UG/1
0.06 SPRAY NASAL
Qty: 1 | Refills: 3 | Status: COMPLETED | COMMUNITY
Start: 2021-08-12 | End: 2023-04-18

## 2023-04-18 RX ORDER — LEVOCETIRIZINE DIHYDROCHLORIDE 5 MG/1
TABLET, FILM COATED ORAL
Refills: 0 | Status: COMPLETED | COMMUNITY
End: 2023-04-18

## 2023-04-18 RX ORDER — BUDESONIDE, MICRONIZED 100 %
POWDER (GRAM) MISCELLANEOUS
Qty: 120 | Refills: 3 | Status: COMPLETED | COMMUNITY
Start: 2021-08-12 | End: 2023-04-18

## 2023-04-18 NOTE — HISTORY OF PRESENT ILLNESS
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 Quiroz for obturator, pending zygomatic implant with Dr. Vines of [facility] ( planning 8/2023) , s/p 9/30/2020 [facility]ESS, last imaging July 2020.  Pt is here f/u and no c/o. pt is doing well, and his moved [place] in 3/2023.  States no change in symptoms, clear rhinorrhea intermittently throughout the day.  Denies dyspnea, post nasal drip, sinus pain/pressure, recent fevers and infections.

## 2023-04-18 NOTE — PROCEDURE
[Post-Op Patency] : post-op patency [None] : none [Flexible Endoscope] : examined with the flexible endoscope [Serial Number: ___] : Serial Number: [unfilled] [FreeTextEntry6] : No lesions in the NC. There is no more crusting along the site of the antrostomy appears patent but with some mucosal edema around it.  No lesions in the NPx.\par

## 2023-04-18 NOTE — PHYSICAL EXAM
[Nasal Endoscopy Performed] : nasal endoscopy was performed, see procedure section for findings [Normal] : no rashes [FreeTextEntry1] : Maxillectomy cavity filling in with soft tissue, with no purulence, no bleeding, the palatal defect is now approx. 1 cm.  No concerning lesions in the OC/OPx on inspection or palpation.\par  [de-identified] : Expected L. facial edema resolved.

## 2023-05-26 NOTE — ASU PATIENT PROFILE, ADULT - FALLEN IN THE PAST
"Subjective:   History was provided by the: mom and dad  Ava Tatum is a 9 m.o. female who is brought in for this 9 month well child visit.    Current Issues:  Current concerns include: no new issues    Review of Nutrition:  Current diet/feeding pattern: Gentlease, table foods and baby foods and puffs  Difficulties with feeding? no    Social Screening:  Current child-care arrangements: no   Sibling relations: see social  Parental coping and self-care: doing well; no concerns  Secondhand smoke exposure? no     Screening Questions:  Risk factors for oral health problems: no  Risk factors for hearing loss: no  Risk factors for lead toxicity: no  Survey of Wellbeing of Young Children Milestones 5/25/2023   Makes sounds that let you know he or she is happy or upset -   Seems happy to see you -   Follows a moving toy with his or her eyes -   Turns head to find the person who is talking -   Holds head steady when being pulled up to a sitting position -   Brings hands together -   Laughs -   Keeps head steady when held in a sitting position -   Makes sounds like "ga," "ma," or "ba" -   Looks when you call his or her name -   2-Month Developmental Score Incomplete   Holds head steady when being pulled up to a sitting position -   Brings hands together -   Laughs -   Keeps head steady when held in a sitting position -   Makes sounds like "ga,"  "ma," or "ba"    -   Looks when you call his or her name -   Rolls over  -   Passes a toy from one hand to the other -   Looks for you or another caregiver when upset -   Holds two objects and bangs them together -   4-Month Developmental Score Incomplete   Makes sounds like "ga", "ma", or "ba" -   Looks when you call his or her name -   Rolls over -   Passes a toy from one hand to the other -   Looks for you or another caregiver when upset -   Holds two objects and bangs them together -   Holds up arms to be picked up -   Gets to a sitting position by him or herself - " "  Picks up food and eats it -   Pulls up to standing -   6-Month Developmental Score Incomplete   Holds up arms to be picked up Very Much   Gets to a sitting position by him or herself Very Much   Picks up food and eats it Very Much   Pulls up to standing Very Much   Plays games like "peek-a-kong" or "pat-a-cake" Very Much   Calls you "mama" or "christiano" or similar name Very Much   Looks around when you say things like "Where's your bottle?" or "Where's your blanket?" Somewhat   Copies sounds that you make Very Much   Walks across a room without help Not Yet   Follows directions - like "Come here" or "Give me the ball" Somewhat   9-Month Developmental Score 16   12-Month Developmental Score Incomplete   15-Month Developmental Score Incomplete   18-Month Developmental Score Incomplete   24-Month Developmental Score Incomplete   30-Month Developmental Score Incomplete   36-Month Developmental Score Incomplete   48-Month Developmental Score Incomplete   60-Month Developmental Score Incomplete     Growth parameters: Noted and are appropriate for age.    Review of Systems - see patient questionnaire answers below    History reviewed. No pertinent past medical history.  History reviewed. No pertinent surgical history.  Family History   Problem Relation Age of Onset    No Known Problems Mother     No Known Problems Father     Hypertension Maternal Grandmother         Copied from mother's family history at birth    No Known Problems Maternal Grandfather     Hypertension Paternal Grandmother     Asthma Paternal Grandfather      Social History     Socioeconomic History    Marital status: Single   Tobacco Use    Passive exposure: Never   Social History Narrative    Lives at home with mom and dad. No smokers. No pets.  No  23     Patient Active Problem List   Diagnosis     affected by maternal prolonged rupture of membranes    Hyperbilirubinemia requiring phototherapy    Eczema    Gastroesophageal reflux disease "       Reviewed Past Medical History, Social History, and Family History-- updated   Objective:   APPEARANCE: Alert. In no Distress. Nontoxic appearing. Well appearing     SKIN: Normal skin turgor. Brisk capillary refill. No cyanosis. Eczema on neck anteriorly  HEAD: Normocephalic, atraumatic,anterior fontanel open,sutures normal .  EYES: Conjunctivae clear. Red reflex bilaterally. No discharge.  EARS: Clear, TMs pearly. Pinnas normal. Light reflex normal.   NOSE: Mucosa pink. Airway clear. No discharge.  MOUTH & THROAT: Moist mucous membranes. No lesions. No mucosal abnormalities.  NECK: Supple.   CHEST:Lungs clear to auscultation. No retractions. No tachypnea or rales.   CARDIOVASCULAR: Regular rate and rhythm without murmur. Pulses equal.   BREASTS: No masses.  GI: Bowel sounds normal. Soft. No masses. No hepatosplenomegaly.   : nl external female  MUSCULOSKELETAL: No gross skeletal deformities, normal muscle tone, joints with full range of motion.  HIPS: symmetric hip/leg skin folds, no perceived leg length discrepancy  NEUROLOGIC: Nonfocal exam,  Normal tone    Assessment:     1. Encounter for well child check without abnormal findings    2. Need for vaccination    3. Encounter for screening for global developmental delays (milestones)         Plan:     1. Anticipatory guidance discussed.  Safety, carseat, baby proofing home, read to baby, oral hygiene.  Gave handout on well-child issues at this age.       Immunizations today: per orders.  I counseled parent on vaccine components.  Rec flu shot and Covid vaccines for age.  Hb and Lead to be drawn at 12 months    Age appropriate physical activity and nutritional counseling were completed during today's visit.    Reviewed SWYC developmental screen- nl    Caught up on Prevnar-13 today.     no

## 2024-06-06 ENCOUNTER — APPOINTMENT (RX ONLY)
Dept: URBAN - METROPOLITAN AREA CLINIC 356 | Facility: CLINIC | Age: 44
Setting detail: DERMATOLOGY
End: 2024-06-06

## 2024-06-06 DIAGNOSIS — L91.8 OTHER HYPERTROPHIC DISORDERS OF THE SKIN: ICD-10-CM

## 2024-06-06 DIAGNOSIS — L259 CONTACT DERMATITIS AND OTHER ECZEMA, UNSPECIFIED CAUSE: ICD-10-CM | Status: INADEQUATELY CONTROLLED

## 2024-06-06 PROBLEM — L30.9 DERMATITIS, UNSPECIFIED: Status: ACTIVE | Noted: 2024-06-06

## 2024-06-06 PROCEDURE — 99203 OFFICE O/P NEW LOW 30 MIN: CPT

## 2024-06-06 PROCEDURE — ? PRESCRIPTION

## 2024-06-06 PROCEDURE — ? PRESCRIPTION MEDICATION MANAGEMENT

## 2024-06-06 PROCEDURE — ? ADDITIONAL NOTES

## 2024-06-06 PROCEDURE — ? COUNSELING

## 2024-06-06 RX ORDER — TRIAMCINOLONE ACETONIDE 1 MG/G
CREAM TOPICAL BID/PRN
Qty: 454 | Refills: 1 | Status: ERX | COMMUNITY
Start: 2024-06-06

## 2024-06-06 RX ADMIN — TRIAMCINOLONE ACETONIDE 1: 1 CREAM TOPICAL at 00:00

## 2024-06-06 ASSESSMENT — BSA RASH: BSA RASH: 4

## 2024-06-06 ASSESSMENT — ITCH NUMERIC RATING SCALE: HOW SEVERE IS YOUR ITCHING?: 8

## 2024-06-06 NOTE — PROCEDURE: PRESCRIPTION MEDICATION MANAGEMENT
Initiate Treatment: triamcinolone acetonide 0.1 % topical cream Bid/prn\\nQuantity: 454.0 g  Days Supply: 30\\nSig: Apply to aa right upper arm bid for up to 2 weeks during flares.
Detail Level: Zone
Render In Strict Bullet Format?: No
Plan: Discussed Isdin sunscreen

## 2024-06-06 NOTE — HPI: RASH
What Type Of Note Output Would You Prefer (Optional)?: Bullet Format
How Severe Is Your Rash?: moderate
Is This A New Presentation, Or A Follow-Up?: Rash
Additional History: Rash on right upper arm onset 2 weeks ago he was in the sun and then he felt like his skin was itchy. He also has some skin tags that he would like remover under both arms

## 2024-06-06 NOTE — PROCEDURE: ADDITIONAL NOTES
Additional Notes: Discussed removal 2 for $50
Render Risk Assessment In Note?: no
Detail Level: Detailed

## 2025-02-05 ENCOUNTER — APPOINTMENT (OUTPATIENT)
Dept: URBAN - METROPOLITAN AREA CLINIC 356 | Facility: CLINIC | Age: 45
Setting detail: DERMATOLOGY
End: 2025-02-05

## 2025-02-05 DIAGNOSIS — L59.0 ERYTHEMA AB IGNE [DERMATITIS AB IGNE]: ICD-10-CM

## 2025-02-05 DIAGNOSIS — L82.0 INFLAMED SEBORRHEIC KERATOSIS: ICD-10-CM

## 2025-02-05 PROCEDURE — 17110 DESTRUCTION B9 LES UP TO 14: CPT

## 2025-02-05 PROCEDURE — 99212 OFFICE O/P EST SF 10 MIN: CPT | Mod: 25

## 2025-02-05 PROCEDURE — ? COUNSELING

## 2025-02-05 PROCEDURE — ? LIQUID NITROGEN

## 2025-02-05 ASSESSMENT — LOCATION DETAILED DESCRIPTION DERM
LOCATION DETAILED: RIGHT SUPERIOR MEDIAL LOWER BACK
LOCATION DETAILED: RIGHT DISTAL DORSAL FOREARM
LOCATION DETAILED: LEFT SUPERIOR MEDIAL LOWER BACK

## 2025-02-05 ASSESSMENT — LOCATION SIMPLE DESCRIPTION DERM
LOCATION SIMPLE: RIGHT LOWER BACK
LOCATION SIMPLE: LEFT LOWER BACK
LOCATION SIMPLE: RIGHT FOREARM

## 2025-02-05 ASSESSMENT — LOCATION ZONE DERM
LOCATION ZONE: TRUNK
LOCATION ZONE: ARM

## 2025-02-05 NOTE — HPI: DISCOLORATION
Additional History: Pt states he works out and used a icy hot pad for back pain and ever since use there has been darker marks on lower back that looks like a tattoo that will not go away
